# Patient Record
(demographics unavailable — no encounter records)

---

## 2017-11-05 NOTE — ER DOCUMENT REPORT
ED General





- General


Stated Complaint: WEAKNESS


Time Seen by Provider: 11/05/17 02:39


Notes: 





Patient is a 75-year-old male that comes by EMS for chief complaint of feeling 

short of breath and lightheaded along with feeling weak.  He states that for 

the past 3 days he has not felt well, he has not eaten much, he has missed some 

of his doses of medications.  He denies current shortness of breath at rest.  

He denies any chest pain, fever, cough, abdominal pain, vomiting.  Past medical 

history of chronic atrial fibrillation, on Pradaxa, on Cardizem, defibrillator, 

COPD, former smoker.  He lives by himself.  He is on 3 L nasal cannula at all 

times at home.


TRAVEL OUTSIDE OF THE [place] IN LAST 30 DAYS: No





- Related Data


Allergies/Adverse Reactions: 


 





No Known Allergies Allergy (Verified 02/21/17 15:21)


 











Past Medical History





- General


Information source: Patient





- Social History


Smoking Status: Former Smoker


Frequency of alcohol use: None


Drug Abuse: None


Lives with: Alone


Family History: Reviewed & Not Pertinent





- Past Medical History


Cardiac Medical History: Reports: Hx Atrial Fibrillation - paroxysmal, Hx 

Coronary Artery Disease, Hx Hypercholesterolemia, Hx Hypertension


   Denies: Hx Heart Attack


Pulmonary Medical History: Reports: Hx Asthma, Hx Bronchitis, Hx COPD, Hx 

Pneumonia - june 2012, Hx Respiratory Failure


   Denies: Hx Tuberculosis


Neurological Medical History: Denies: Hx Cerebrovascular Accident, Hx Seizures


Renal/ Medical History: Reports: Hx Benign Prostatic Hyperplasia, Hx Kidney 

Stones


GI Medical History: Reports: Hx Hiatal Hernia


Musculoskeltal Medical History: Reports Hx Arthritis


Past Surgical History: Reports: Hx Pacemaker





- Immunizations


Hx Diphtheria, Pertussis, Tetanus Vaccination: Yes


Hx Pneumococcal Vaccination: 01/01/11





Review of Systems





- Review of Systems


Constitutional: See HPI


EENT: No symptoms reported


Cardiovascular: See HPI


Respiratory: See HPI


Gastrointestinal: No symptoms reported


Genitourinary: No symptoms reported


Male Genitourinary: No symptoms reported


Musculoskeletal: No symptoms reported


Skin: No symptoms reported


Hematologic/Lymphatic: No symptoms reported


Neurological/Psychological: No symptoms reported





Physical Exam





- Vital signs


Vitals: 


 











Resp


 


 20 


 


 11/05/17 02:36











Interpretation: Normal





- General


General appearance: Appears well, Alert


In distress: None





- HEENT


Head: Normocephalic, Atraumatic


Eyes: Normal


Pupils: PERRL





- Respiratory


Respiratory status: No respiratory distress.  No: Labored, Tachypnea


Chest status: Nontender


Breath sounds: Decreased air movement.  No: Nonproductive cough, Productive 

cough, Rales, Rhonchi, Stridor, Wheezing


Chest palpation: Normal





- Cardiovascular


Rhythm: Irregularly irregular, Tachycardia


Heart sounds: Normal auscultation, S1 appreciated, S2 appreciated


Normal capillary refill: Yes





- Abdominal


Inspection: Normal


Distension: No distension


Bowel sounds: Normal


Tenderness: Nontender.  No: Tender, Guarding


Organomegaly: No organomegaly





- Back


Back: Normal, Nontender.  No: Tender





- Extremities


General upper extremity: Normal inspection, Nontender, Normal color, Normal ROM

, Normal temperature


General lower extremity: Normal inspection, Nontender, Normal color, Normal ROM

, Normal temperature, Normal weight bearing.  No: Ryan's sign





- Neurological


Neuro grossly intact: Yes


Cognition: Normal


Orientation: AAOx4


[place] Coma Scale Eye Opening: Spontaneous


[place] Coma Scale Verbal: Oriented


[place] Coma Scale Motor: Obeys Commands


[place] Coma Scale Total: 15


Speech: Normal


Motor strength normal: LUE, RUE, LLE, RLE


Sensory: Normal





- Psychological


Associated symptoms: Normal affect, Normal mood





- Skin


Skin Temperature: Warm


Skin Moisture: Dry


Skin Color: Normal





Course





- Re-evaluation


Re-evalutation: 





11/05/17 03:48


Patient initially tachycardic in the 130s with atrial fibrillation with rapid 

ventricular response noted on the monitor.  However patient was given 1 L IV 

fluid bolus, after EKG was obtained EKG shows sinus tachycardia at a rate of 

121.  No T-wave inversions in consecutive leads or ST segment changes, no 

significant change from prior.





Patient has a soft abdomen, clear lungs, he is generally well-appearing.





CBC shows significant anemia at hemoglobin of 7.0.  This is most likely the 

cause of patient's symptoms.  I checked his rectal exam, I did not see any 

gross bleeding, patient denies any blood in his stool recently, laboratory 

results is negative for blood in the stool.  Chemistry generally unremarkable, 

cardiac enzymes not elevated, urinalysis shows evidence of some dehydration but 

is otherwise unremarkable.





Beginning transfusion.  Patient is still somewhat tachycardic.  He states he 

has had trouble eating and caring for himself at home.  He lives alone.  

Concern about whether or not patient will be stable for discharge.  He is not 

stable at this time.  Discussed with Dr. Jamison, recommends admission to the 

hospital.





Discussed with Dr. Cavazos, on-call for Dr. Vazquez patient's primary 

provider, patient will be admitted to telemetry.  Patient states satisfaction 

and agreement with this plan.





- Vital Signs


Vital signs: 


 











Temp Pulse Resp BP Pulse Ox


 


 98.4 F   110 H  16   115/48 L  100 


 


 11/05/17 06:32  11/05/17 06:32  11/05/17 06:32  11/05/17 06:32  11/05/17 06:32














- Laboratory


Result Diagrams: 


 11/05/17 02:45





 11/05/17 02:45


Laboratory results interpreted by me: 


 











  11/05/17 11/05/17 11/05/17





  02:45 02:45 03:15


 


RBC  2.30 L  


 


Hgb  7.0 L  


 


Hct  21.4 L  


 


RDW  19.9 H  


 


Seg Neutrophils %  79.7 H  


 


Lymphocytes %  12.4 L  


 


BUN   40 H 


 


Glucose   118 H 


 


AST   11 L 


 


Creatine Kinase   < 20 L 


 


Total Protein   5.6 L 


 


Urine Ketones   


 


Urine Blood   


 


Ur Leukocyte Esterase   


 


Crossmatch    See Detail














  11/05/17





  04:35


 


RBC 


 


Hgb 


 


Hct 


 


RDW 


 


Seg Neutrophils % 


 


Lymphocytes % 


 


BUN 


 


Glucose 


 


AST 


 


Creatine Kinase 


 


Total Protein 


 


Urine Ketones  TRACE H


 


Urine Blood  SMALL H


 


Ur Leukocyte Esterase  TRACE H


 


Crossmatch 














Discharge





- Discharge


Clinical Impression: 


 Symptomatic anemia, Tachycardia, Shortness of breath, Weakness





Condition: Stable


Disposition: ADMITTED AS INPATIENT


Admitting Provider: [facility]


Unit Admitted: Telemetry

## 2017-11-05 NOTE — EKG REPORT
SEVERITY:- ABNORMAL ECG -

SINUS TACHYCARDIA

ANTERIOR INFARCT, AGE INDETERMINATE

:

Confirmed by: Franco Lazar 05-Nov-2017 15:01:47

## 2017-11-05 NOTE — RADIOLOGY REPORT (SQ)
EXAM DESCRIPTION:  CHEST SINGLE VIEW



COMPLETED DATE/TIME:  11/5/2017 3:01 am



REASON FOR STUDY:  shortness of breath



COMPARISON:  7/14/2017



EXAM PARAMETERS:  NUMBER OF VIEWS: One view.

TECHNIQUE: Single frontal radiographic view of the chest acquired.

RADIATION DOSE: NA

LIMITATIONS: None.



FINDINGS:  LUNGS AND PLEURA: Severe emphysema.  No focal airspace disease, pleural effusion, or pneum
othorax.

MEDIASTINUM AND HILAR STRUCTURES: No masses.  Contour normal.

HEART AND VASCULAR STRUCTURES: Heart normal in size.  Normal vasculature.

BONES: No acute findings.

HARDWARE: None in the chest.

OTHER: No other significant finding.



IMPRESSION:  SEVERE EMPHYSEMA.  NO ACUTE CARDIOPULMONARY PROCESS.  NO SIGNIFICANT CHANGE FROM PRIOR S
TRE.



TECHNICAL DOCUMENTATION:  JOB ID:  [id number]

 2011 [employer]- All Rights Reserved

## 2017-11-05 NOTE — PDOC H&P
History of Present Illness


Admission Date/PCP: 


  11/05/17 05:58





  





History of Present Illness: 


PEDRO REYES is a 75 year old male patient of Dr Vazquez who was brought to 

the ED by EMS crew with complain of generalized weakness, shortness of breath 

and lightheadedness. Patient reported that he has not been feeling well for 

about 3 days prior to his presentation in the ED. Due to his current illness he 

has been missing some of his medication administration. His initial findings in 

the ED revealed significant tachycardia with cardiac monitor revealing atrial 

fibrillation. There was concern for associated significant anemia with 

hemoglobin in the 7.0 gm/dL range. His stool guaiac test for occult blood was 

negative. I spoke with his daughter via telephone earlier this morning and she 

reported that patient usually present to the hospital like this in the past. 

She requested for SNF placement fort rehabilitation. His morbidities include 

COPD with need for supplemental oxygen, Paroxysmal Atrial Fibrillation, 

Hypertension, Hyperlipidemia, BPH, Renal stones, Osteoarthritis, Hiatus hernia, 

and Pacemaker placement.





Past Medical History


Cardiac Medical History: Reports: Atrial Fibrillation - paroxysmal, Coronary 

Artery Disease, Hyperlipidema, Hypertension


   Denies: Myocardial Infarction


Pulmonary Medical History: Reports: Asthma, Bronchitis, Chronic Obstructive 

Pulmonary Disease (COPD), Pneumonia - june 2012, Respiratory Failure


   Denies: Tuberculosis


Neurological Medical History: 


   Denies: Seizures


GI Medical History: Reports: Hiatal Hernia


Musculoskeltal Medical History: Reports: Arthritis


Psychiatric Medical History: Reports: Depression


Hematology: Reports: Anemia - chronic





Past Surgical History


Past Surgical History: Reports: Pacemaker





Social History


Lives with: Alone


Smoking Status: Former Smoker


Frequency of Alcohol Use: None


Hx Recreational Drug Use: No - denies


Drugs: None


Hx Prescription Drug Abuse: No





- Advance Directive


Resuscitation Status: Full Code





Family History


Family History: Reviewed & Not Pertinent


Parental Family History Reviewed: Yes


Children Family History Reviewed: Yes


Sibling(s) Family History Reviewed.: Yes





Medication/Allergy


Home Medications: 








Dabigatran Etexilate Mesylate [Pradaxa 150 mg Capsule] 150 mg PO Q12 07/14/17 


Diltiazem HCl [Diltiazem 24Hr ER] 180 mg PO DAILY 07/14/17 


Esomeprazole Mag Trihydrate [Nexium] 40 mg PO DAILY 07/14/17 


Finasteride [Proscar 5 mg Tablet] 5 mg PO DAILY 07/14/17 


Fluticasone/Salmeterol [Advair 250-50 Diskus 28 dose] 1 puff IH Q12 07/14/17 


Hydrochlorothiazide 25 mg PO DAILY 07/14/17 


Montelukast Sodium [Singulair 10 mg Tablet] 10 mg PO QPM 07/14/17 


Sertraline HCl [Zoloft 50 mg Tablet] 50 mg PO DAILY 07/14/17 


Simvastatin 40 mg PO QHS 07/14/17 


Tamsulosin HCl [Flomax 0.4 mg Cap.sr] 0.8 mg PO QHS 07/14/17 


Tiotropium Bromide [Spiriva Handihaler 18 mcg/dose (30 Dose)] 1 cap IH DAILY 07/ 14/17 


Albuterol Sulfate [Proair Hfa Inhalation Aerosol 8.5 gm Mdi] 2 puff IH Q4HP PRN 

11/05/17 


Megestrol Acetate 800 mg PO DAILY 11/05/17 


Roflumilast [Daliresp 500 mcg Tablet] 500 mcg PO DAILY 11/05/17 








Allergies/Adverse Reactions: 


 





No Known Allergies Allergy (Verified 02/21/17 15:21)


 











Review of Systems


Constitutional: PRESENT: anorexia, fatigue, weakness.  ABSENT: as per HPI, 

chills, fever(s), headache(s), night sweats, weight gain, weight loss, other


Eyes: PRESENT: visual disturbances


Ears: PRESENT: hearing changes


Nose, Mouth, and Throat: PRESENT: mouth pain.  ABSENT: as per HPI, headache(s), 

sore throat, vertigo, other


Cardiovascular: PRESENT: dyspnea on exertion, palpitations.  ABSENT: as per HPI

, chest pain, edema, orthropnea, other


Respiratory: ABSENT: cough, hemoptysis


Gastrointestinal: ABSENT: abdominal pain, constipation, diarrhea, hematemesis, 

hematochezia, nausea, vomiting


Genitourinary: ABSENT: dysuria, hematuria


Musculoskeletal: PRESENT: deformity


Integumentary: ABSENT: rash, wounds


Neurological: PRESENT: weakness.  ABSENT: as per HPI, abnormal gait, abnormal 

movements, abnormal speech, confusion, convulsions, dizziness, focal weakness, 

frequent falls, lack of coordination, memory loss, numbness, paresthesias, 

restless legs, syncope, tingling, tremor(s), vertigo, other


Psychiatric: ABSENT: anxiety, depression, homidical ideation, suicidal ideation


Endocrine: ABSENT: cold intolerance, heat intolerance, menstrual abnormalities, 

polydipsia, polyuria


Hematologic/Lymphatic: ABSENT: easy bleeding, easy bruising, lymphadenopathy





Physical Exam


Vital Signs: 


 











Temp Pulse Resp BP Pulse Ox


 


 98.2 F   98   16   116/69   100 


 


 11/05/17 12:14  11/05/17 12:14  11/05/17 12:14  11/05/17 12:14  11/05/17 12:14








 Intake & Output











 11/04/17 11/05/17 11/06/17





 07:59 06:59 06:59


 


Intake Total   350


 


Output Total   


 


Balance   350


 


Weight   











General appearance: PRESENT: no acute distress, well-developed, well-nourished


Head exam: PRESENT: atraumatic, normocephalic


Eye exam: PRESENT: conjunctiva pink, EOMI, PERRLA.  ABSENT: scleral icterus


Mouth exam: PRESENT: moist


Neck exam: PRESENT: full ROM.  ABSENT: carotid bruit, JVD, lymphadenopathy, 

thyromegaly


Respiratory exam: PRESENT: clear to auscultation rivera


Cardiovascular exam: PRESENT: RRR.  ABSENT: diastolic murmur, rubs, systolic 

murmur


Pulses: PRESENT: normal dorsalis pedis pul, +2 pedal pulses bilateral


Vascular exam: PRESENT: normal capillary refill.  ABSENT: pallor


GI/Abdominal exam: PRESENT: normal bowel sounds, soft.  ABSENT: distended, 

guarding, mass, organolmegaly, rebound, tenderness


Rectal exam: PRESENT: deferred - completed during ED evaluation, heme (-) stool


Extremities exam: ABSENT: pedal edema


Musculoskeletal exam: PRESENT: normal inspection


Neurological exam: PRESENT: alert, awake, oriented to person, oriented to place

, oriented to time, oriented to situation, CN II-XII grossly intact.  ABSENT: 

motor sensory deficit


Psychiatric exam: PRESENT: appropriate affect, normal mood.  ABSENT: homicidal 

ideation, suicidal ideation


Skin exam: PRESENT: dry, intact, warm.  ABSENT: cyanosis, rash





Results


Impressions: 


 





Chest X-Ray  11/05/17 02:45


IMPRESSION:  SEVERE EMPHYSEMA.  NO ACUTE CARDIOPULMONARY PROCESS.  NO 

SIGNIFICANT CHANGE FROM PRIOR STUDY.


 














Assessment & Plan





- Diagnosis


(1) Symptomatic anemia


Is this a current diagnosis for this admission?: Yes   


Plan: 


See covering admitting attending physician orders.








(2) Atrial fibrillation with rapid ventricular response


Is this a current diagnosis for this admission?: Yes   


Plan: 


See covering admitting attending physician orders.








(3) End stage chronic obstructive pulmonary disease


Is this a current diagnosis for this admission?: Yes   


Plan: 


See covering admitting attending physician orders.








- Time


Time Spent: 50 to 70 Minutes


Medications reviewed and adjusted accordingly: Yes


Anticipated discharge: SNF - for short term rehabilitation at [facility] as 

per daughter's choice.





- Inpatient Certification


Based on my medical assessment, after consideration of the patient's 

comorbidities, presenting symptoms, or acuity I expect that the services needed 

warrant INPATIENT care.: Yes


I certify that my determination is in accordance with my understanding of 

Medicare's requirements for reasonable and necessary INPATIENT services [42 CFR 

412.3e].: Yes


Medical Necessity: Need Close Monitoring Due to Risk of Patient Decompensation, 

Need For IV Fluids, Need For Continuous Telemetry Monitoring, Risk of 

Complication if Not Cared For in Hospital


Post Hospital Care: D/C Planner Documentation





- Plan Summary


Plan Summary: 


See covering admitting attending physician orders.

## 2017-11-06 NOTE — PDOC PROGRESS REPORT
Subjective


Progress Note for:: 11/06/17


Subjective:: 





He was admitted over the weekend for the management of generalized body weakness

, he was found to have severe anemia requiring blood transfusion.  He admitted 

to passing black stools





Physical Exam


Vital Signs: 


 











Temp Pulse Resp BP Pulse Ox


 


 99.1 F   102 H  22 H  102/51 L  100 


 


 11/06/17 15:58  11/06/17 15:58  11/06/17 15:58  11/06/17 15:58  11/06/17 15:58








 Intake & Output











 11/05/17 11/06/17 11/07/17





 06:59 06:59 06:59


 


Intake Total  3230 2380


 


Output Total  1700 300


 


Balance  1530 2080


 


Weight   











General appearance: PRESENT: thin


Head exam: PRESENT: atraumatic, normocephalic


Eye exam: PRESENT: PERRLA.  ABSENT: scleral icterus


Neck exam: PRESENT: full ROM


Respiratory exam: PRESENT: clear to auscultation rivera


Cardiovascular exam: PRESENT: RRR, +S1, +S2


Pulses: PRESENT: normal dorsalis pedis pul, +2 pedal pulses bilateral


Vascular exam: PRESENT: normal capillary refill


GI/Abdominal exam: PRESENT: normal bowel sounds, soft


Rectal exam: PRESENT: deferred


Neurological exam: PRESENT: alert


Psychiatric exam: PRESENT: appropriate affect, normal mood


Skin exam: PRESENT: dry, intact, warm





Results


Laboratory Results: 


 





 11/06/17 06:52 





 11/06/17 06:52 





 











  11/06/17 11/06/17





  06:52 06:52


 


WBC  6.0 


 


RBC  2.23 L 


 


Hgb  7.1 L 


 


Hct  20.8 L 


 


MCV  93 


 


MCH  31.9 


 


MCHC  34.2 


 


RDW  17.0 H 


 


Plt Count  190 


 


Seg Neutrophils %  79.3 H 


 


Lymphocytes %  12.0 L 


 


Monocytes %  7.2 


 


Eosinophils %  1.1 


 


Basophils %  0.4 


 


Absolute Neutrophils  4.7 


 


Absolute Lymphocytes  0.7 


 


Absolute Monocytes  0.4 


 


Absolute Eosinophils  0.1 


 


Absolute Basophils  0.0 


 


Sodium   140.2


 


Potassium   3.7


 


Chloride   102


 


Carbon Dioxide   33 H


 


Anion Gap   5


 


BUN   22 H


 


Creatinine   0.78


 


Est GFR ( Amer)   > 60


 


Est GFR (Non-Af Amer)   > 60


 


Glucose   91


 


Calcium   8.7


 


Total Bilirubin   0.7


 


AST   10 L


 


ALT   19 L


 


Alkaline Phosphatase   36 L


 


Total Protein   4.3 L


 


Albumin   2.5 L











Impressions: 


 





Chest X-Ray  11/05/17 02:45


IMPRESSION:  SEVERE EMPHYSEMA.  NO ACUTE CARDIOPULMONARY PROCESS.  NO 

SIGNIFICANT CHANGE FROM PRIOR STUDY.


 














Assessment & Plan





- Diagnosis


(1) Gastrointestinal bleeding


Qualifiers: 


   GI bleed type/associated pathology: unspecified gastrointestinal hemorrhage 

type   Qualified Code(s): K92.2 - Gastrointestinal hemorrhage, unspecified   


Is this a current diagnosis for this admission?: Yes   


Plan: 


Continue blood transfusion, GI consultation will be obtained








(2) Anemia due to acute blood loss


Is this a current diagnosis for this admission?: Yes   





(3) End stage chronic obstructive pulmonary disease


Is this a current diagnosis for this admission?: Yes

## 2017-11-07 NOTE — PDOC PROGRESS REPORT
Subjective


Progress Note for:: 11/07/17


Subjective:: 





The hemoglobin is 6.9 after many blood transfusion, GI consultation is obtained 

for colonoscopy and EGD.  Patient is also malnourished, body mass index 15.2





Physical Exam


Vital Signs: 


 











Temp Pulse Resp BP Pulse Ox


 


 99.1 F   91   20   94/50 L  100 


 


 11/07/17 17:30  11/07/17 17:30  11/07/17 17:30  11/07/17 17:30  11/07/17 17:30








 Intake & Output











 11/06/17 11/07/17 11/08/17





 06:59 06:59 06:59


 


Intake Total 3230 2800 0


 


Output Total 1700 1050 


 


Balance 1530 1750 0











General appearance: PRESENT: no acute distress, thin


Eye exam: PRESENT: PERRLA


Respiratory exam: PRESENT: decreased breath sounds


Cardiovascular exam: PRESENT: +S1, +S2


GI/Abdominal exam: PRESENT: soft


Neurological exam: PRESENT: alert, CN II-XII grossly intact





Results


Laboratory Results: 


 





 11/07/17 13:43 





 11/07/17 13:43 





 











  11/07/17 11/07/17 11/07/17





  12:50 13:43 13:43


 


WBC   4.7 


 


RBC   2.20 L 


 


Hgb   6.9 L 


 


Hct   20.5 L 


 


MCV   93 


 


MCH   31.5 


 


MCHC   33.8 


 


RDW   16.8 H 


 


Plt Count   183 


 


Seg Neutrophils %   75.2 


 


Lymphocytes %   16.1 


 


Monocytes %   7.1 


 


Eosinophils %   1.4 


 


Basophils %   0.2 


 


Absolute Neutrophils   3.6 


 


Absolute Lymphocytes   0.8 


 


Absolute Monocytes   0.3 


 


Absolute Eosinophils   0.1 


 


Absolute Basophils   0.0 


 


Sodium    138.3


 


Potassium    3.7


 


Chloride    100


 


Carbon Dioxide    34 H


 


Anion Gap    4 L


 


BUN    16


 


Creatinine    0.65


 


Est GFR ( Amer)    > 60


 


Est GFR (Non-Af Amer)    > 60


 


Glucose    101


 


Calcium    9.1


 


Total Bilirubin    0.5


 


AST    11 L


 


ALT    21


 


Alkaline Phosphatase    37 L


 


Total Protein    4.2 L


 


Albumin    2.4 L


 


Stool Occult Blood  POSITIVE  











Impressions: 


 





Chest X-Ray  11/05/17 02:45


IMPRESSION:  SEVERE EMPHYSEMA.  NO ACUTE CARDIOPULMONARY PROCESS.  NO 

SIGNIFICANT CHANGE FROM PRIOR STUDY.


 














Assessment & Plan





- Diagnosis


(1) Gastrointestinal bleeding


Qualifiers: 


   GI bleed type/associated pathology: unspecified gastrointestinal hemorrhage 

type   Qualified Code(s): K92.2 - Gastrointestinal hemorrhage, unspecified   


Is this a current diagnosis for this admission?: Yes   





(2) Anemia due to acute blood loss


Is this a current diagnosis for this admission?: Yes   


Plan: 


Transfuse packed red blood cells








(3) End stage chronic obstructive pulmonary disease


Is this a current diagnosis for this admission?: Yes

## 2017-11-08 NOTE — PDOC CONSULTATION
Consultation


Consult Date: 11/07/17





History of Present Illness


Admission Date/PCP: 


  11/05/17 05:58





  





History of Present Illness: 





This is a 75-year-old patient was admitted on 11/5/2017 with generalized 

weakness shortness of breath and lightheadedness.  He had not been feeling well 

for 3 days prior to being admitted.  He has also been seeing black stools off 

and on for the last 1-2 weeks.  On presentation his hemoglobin was 7 though his 

stool was negative for occult blood.  He denies abdominal pain.  He takes 

Pradaxa every day


He had an EGD and colonoscopy in February of this year that showed multiple 

adenomatous polyps and he suffered a post polypectomy bleeding thereafter.  He 

had another colonoscopy on 3/1/2017 with treatment of the post polypectomy 

ulcer.





Past Medical History


Cardiac Medical History: Reports: Atrial Fibrillation - paroxysmal, Coronary 

Artery Disease, Hyperlipidema, Hypertension


   Denies: Myocardial Infarction


Pulmonary Medical History: Reports: Asthma, Bronchitis, Chronic Obstructive 

Pulmonary Disease (COPD), Pneumonia - june 2012, Respiratory Failure


   Denies: Tuberculosis


Neurological Medical History: 


   Denies: Seizures


GI Medical History: Reports: Hiatal Hernia


Musculoskeltal Medical History: Reports: Arthritis


Psychiatric Medical History: Reports: Depression


Hematology: Reports: Anemia - chronic





Past Surgical History


Past Surgical History: Reports: Pacemaker





Social History


Lives with: Alone


Smoking Status: Former Smoker


Frequency of Alcohol Use: None


Hx Recreational Drug Use: No - denies


Drugs: None


Hx Prescription Drug Abuse: No





- Advance Directive


Resuscitation Status: Full Code





Family History


Family History: Reviewed & Not Pertinent


Parental Family History Reviewed: No


Children Family History Reviewed: NA


Sibling(s) Family History Reviewed.: NA





Medication/Allergy


Home Medications: 








Dabigatran Etexilate Mesylate [Pradaxa 150 mg Capsule] 150 mg PO Q12 07/14/17 


Diltiazem HCl [Diltiazem 24Hr ER] 180 mg PO DAILY 07/14/17 


Esomeprazole Mag Trihydrate [Nexium] 40 mg PO DAILY 07/14/17 


Finasteride [Proscar 5 mg Tablet] 5 mg PO DAILY 07/14/17 


Fluticasone/Salmeterol [Advair 250-50 Diskus 28 dose] 1 puff IH Q12 07/14/17 


Hydrochlorothiazide 25 mg PO DAILY 07/14/17 


Montelukast Sodium [Singulair 10 mg Tablet] 10 mg PO QPM 07/14/17 


Sertraline HCl [Zoloft 50 mg Tablet] 50 mg PO DAILY 07/14/17 


Simvastatin 40 mg PO QHS 07/14/17 


Tamsulosin HCl [Flomax 0.4 mg Cap.sr] 0.8 mg PO QHS 07/14/17 


Tiotropium Bromide [Spiriva Handihaler 18 mcg/dose (30 Dose)] 1 cap IH DAILY 07/ 14/17 


Albuterol Sulfate [Proair Hfa Inhalation Aerosol 8.5 gm Mdi] 2 puff IH Q4HP PRN 

11/05/17 


Megestrol Acetate 800 mg PO DAILY 11/05/17 


Roflumilast [Daliresp 500 mcg Tablet] 500 mcg PO DAILY 11/05/17 








Allergies/Adverse Reactions: 


 





No Known Allergies Allergy (Verified 02/21/17 15:21)


 











Review of Systems


All systems: reviewed and no additional remarkable complaints except as stated





Physical Exam


Vital Signs: 


 











Temp Pulse Resp BP Pulse Ox


 


 97.8 F   122 H  16   130/85 H  96 


 


 11/08/17 16:19  11/08/17 18:25  11/08/17 18:25  11/08/17 18:25  11/08/17 18:25








 Intake & Output











 11/07/17 11/08/17 11/09/17





 06:59 06:59 06:59


 


Intake Total 2800 2583 1150


 


Output Total 1050 1290 


 


Balance 1750 1293 1150


 


Weight  56.6 kg 











Exam: 





General: Patient is alert and looks well.





HEENT: There is pallor.  PERRLA.  Oropharynx normal





Respiratory: Reduced breath sounds bilaterally l





Cardiovascular: Heart sounds 1 and 2 normal with no murmurs.





Abdominal: Not distended.  Soft and nontender.  Liver and spleen not palpable.  

No ascites demonstrated.  Bowel sounds active.  Rectal examination was deferred.





Extremities: No edema





Neurological: Alert and oriented x4.  Grossly nonfocal.  Normal speech





Skin: No significant rash





Psychological: Normal affect





Results


Laboratory Results: 


 





 11/07/17 13:43 





 11/07/17 13:43 








Impressions: 


 





Chest X-Ray  11/05/17 02:45


IMPRESSION:  SEVERE EMPHYSEMA.  NO ACUTE CARDIOPULMONARY PROCESS.  NO 

SIGNIFICANT CHANGE FROM PRIOR STUDY.


 














Assessment & Plan





- Diagnosis


(1) Gastrointestinal bleeding


Qualifiers: 


   GI bleed type/associated pathology: unspecified gastrointestinal hemorrhage 

type   Qualified Code(s): K92.2 - Gastrointestinal hemorrhage, unspecified   


Is this a current diagnosis for this admission?: Yes   


Plan: 


He presented with melena over the last 1-2 weeks and symptomatic anemia.  He 

also takes Pradaxa every day.  He will undergo an EGD and colonoscopy for 

further evaluation.








(2) High risk medication use


Is this a current diagnosis for this admission?: Yes   





(3) Anemia due to acute blood loss


Is this a current diagnosis for this admission?: Yes   





(4) Symptomatic anemia


Is this a current diagnosis for this admission?: Yes   





(5) Chronic atrial fibrillation


Is this a current diagnosis for this admission?: Yes

## 2017-11-08 NOTE — OPERATIVE REPORT
Operative Report


DATE OF SURGERY: 11/08/17


Operative Report: 





Pre-op diagnosis: GI bleeding





Post-op diagnosis:


1.  Active bleeding in the third part of duodenum ?AVM vs Dieulafoy's lesion


2.  Sigmoid diverticulosis


3.  Limited view of the colon





Surgery: Upper endoscopy with argon plasma coagulation, and Colonoscopy 





Medications: Versed 4mg,   Fentanyl  1mcg IV push





Tissue removed: 





Procedure: After informed consent obtained from patient, patient's pharynx was 

sprayed with Hurricane and conscious sedation was achieved.  The upper 

endoscope was then inserted into the esophagus under direct vision and advanced 

into the stomach and further into the duodenum.  Detailed examination of the 

duodenum, stomach and the esophagus was then performed. 


 A digital rectal examination was performed and this was unremarkable.  The 

colonoscope was inserted into the rectum and advanced to the cecum.  The 

appendiceal orifice and the terminal ileum were both identified.  The mucosa 

was examined into details as the colonoscope was slowly pulled out of the 

patient.  The endoscope was retroflexed in the rectum. Patient tolerated the 

procedure well.





Findings





Esophagus: Normal


Stomach: Gastric body polyp.  No evidence of bleeding


Duodenum: Active bleeding was identified in the third part of duodenum with no 

obvious mucosal abnormality.  It was difficult to view this area in detail due 

to significant peristalsis despite using IV glucagon.  Eventually the general 

area of bleeding was identified and treated with argon.


Cecum: Normal


Ascending colon: Normal


Transverse colon: Limited view


Descending colon: Normal


Sigmoid colon: A few diverticuli noted.  View of the colon was limited


Rectum: Limited view





Plan: Avoid anticoagulants for many days and continue to follow H&H


OPERATION: .

## 2017-11-08 NOTE — PDOC PROGRESS REPORT
Subjective


Progress Note for:: 11/08/17


Subjective:: 





Patient is scheduled for colonoscopy, EGD today





Physical Exam


Vital Signs: 


 











Temp Pulse Resp BP Pulse Ox


 


 98.5 F   87   12   102/53 L  100 


 


 11/08/17 11:56  11/08/17 14:00  11/08/17 11:56  11/08/17 11:56  11/08/17 11:56








 Intake & Output











 11/07/17 11/08/17 11/09/17





 06:59 06:59 06:59


 


Intake Total 2800 2583 


 


Output Total 1050 1290 


 


Balance 1750 1293 


 


Weight  56.6 kg 











General appearance: PRESENT: no acute distress


Head exam: PRESENT: atraumatic, normocephalic


Eye exam: PRESENT: conjunctiva pink, EOMI, PERRLA


Ear exam: PRESENT: normal external ear exam


Mouth exam: PRESENT: moist, tongue midline


Neck exam: PRESENT: full ROM


Respiratory exam: PRESENT: clear to auscultation rivera


Cardiovascular exam: PRESENT: RRR, +S1, +S2


Pulses: PRESENT: normal dorsalis pedis pul, +2 pedal pulses bilateral


Vascular exam: PRESENT: normal capillary refill


GI/Abdominal exam: PRESENT: normal bowel sounds, soft


Rectal exam: PRESENT: deferred


Neurological exam: PRESENT: alert, awake, oriented to person, oriented to place

, oriented to time, oriented to situation, CN II-XII grossly intact.  ABSENT: 

motor sensory deficit


Psychiatric exam: PRESENT: appropriate affect, normal mood


Skin exam: PRESENT: dry, intact, warm





Results


Laboratory Results: 


 





 11/07/17 13:43 





 11/07/17 13:43 








Impressions: 


 





Chest X-Ray  11/05/17 02:45


IMPRESSION:  SEVERE EMPHYSEMA.  NO ACUTE CARDIOPULMONARY PROCESS.  NO 

SIGNIFICANT CHANGE FROM PRIOR STUDY.


 














Assessment & Plan





- Diagnosis


(1) Gastrointestinal bleeding


Qualifiers: 


   GI bleed type/associated pathology: unspecified gastrointestinal hemorrhage 

type   Qualified Code(s): K92.2 - Gastrointestinal hemorrhage, unspecified   


Is this a current diagnosis for this admission?: Yes   





(2) Anemia due to acute blood loss


Is this a current diagnosis for this admission?: Yes   





(3) End stage chronic obstructive pulmonary disease


Is this a current diagnosis for this admission?: Yes   





(4) Undernutrition syndrome


Is this a current diagnosis for this admission?: Yes

## 2017-11-09 NOTE — PDOC PROGRESS REPORT
Subjective


Progress Note for:: 11/09/17


Subjective:: 





He was seen by the bedside, yesterday he had EGD and colonoscopy he was found 

to have active bleeding in the top part of duodenum with no obvious mucosa 

pathology.





Physical Exam


Vital Signs: 


 











Temp Pulse Resp BP Pulse Ox


 


 98.5 F   94   12   109/59 L  98 


 


 11/09/17 15:30  11/09/17 15:30  11/09/17 15:30  11/09/17 15:30  11/09/17 15:30








 Intake & Output











 11/08/17 11/09/17 11/10/17





 06:59 06:59 06:59


 


Intake Total 2583 2070 1977


 


Output Total 1290 600 925


 


Balance 1293 1470 1052


 


Weight 56.6 kg 56.4 kg 











General appearance: PRESENT: no acute distress


Eye exam: PRESENT: PERRLA


Respiratory exam: PRESENT: clear to auscultation rivera


Cardiovascular exam: PRESENT: +S1, +S2


GI/Abdominal exam: PRESENT: soft





Results


Laboratory Results: 


 





 11/09/17 09:27 





 11/09/17 09:27 





 











  11/09/17 11/09/17





  09:27 09:27


 


WBC  5.5 


 


RBC  2.58 L 


 


Hgb  8.3 L 


 


Hct  24.1 L 


 


MCV  93 


 


MCH  32.0 


 


MCHC  34.3 


 


RDW  16.0 H 


 


Plt Count  169 


 


Seg Neutrophils %  85.3 H 


 


Lymphocytes %  8.2 L 


 


Monocytes %  5.2 


 


Eosinophils %  1.0 


 


Basophils %  0.3 


 


Absolute Neutrophils  4.7 


 


Absolute Lymphocytes  0.4 L 


 


Absolute Monocytes  0.3 


 


Absolute Eosinophils  0.1 


 


Absolute Basophils  0.0 


 


Sodium   139.6


 


Potassium   3.4 L


 


Chloride   102


 


Carbon Dioxide   33 H


 


Anion Gap   5


 


BUN   10


 


Creatinine   0.59


 


Est GFR ( Amer)   > 60


 


Est GFR (Non-Af Amer)   > 60


 


Glucose   162 H


 


Calcium   8.6


 


Total Bilirubin   0.6


 


AST   16 L


 


ALT   29


 


Alkaline Phosphatase   36 L


 


Total Protein   4.0 L


 


Albumin   2.2 L











Impressions: 


 





Chest X-Ray  11/05/17 02:45


IMPRESSION:  SEVERE EMPHYSEMA.  NO ACUTE CARDIOPULMONARY PROCESS.  NO 

SIGNIFICANT CHANGE FROM PRIOR STUDY.


 














Assessment & Plan





- Diagnosis


(1) Gastrointestinal bleeding


Qualifiers: 


   GI bleed type/associated pathology: unspecified gastrointestinal hemorrhage 

type   Qualified Code(s): K92.2 - Gastrointestinal hemorrhage, unspecified   


Is this a current diagnosis for this admission?: Yes   





(2) Anemia due to acute blood loss


Is this a current diagnosis for this admission?: Yes   





(3) End stage chronic obstructive pulmonary disease


Is this a current diagnosis for this admission?: Yes   





(4) Undernutrition syndrome


Is this a current diagnosis for this admission?: Yes   





(5) Dieulafoy lesion of duodenum


Is this a current diagnosis for this admission?: Yes   





(6) Upper gastrointestinal bleeding


Is this a current diagnosis for this admission?: Yes   





(7) Weight loss


Is this a current diagnosis for this admission?: Yes   


Plan: 


HIV screening test ordered

## 2017-11-10 NOTE — PDOC PROGRESS REPORT
Subjective


Progress Note for:: 11/10/17


Subjective:: 





The hemoglobin is low today again he required blood transfusion





Physical Exam


Vital Signs: 


 











Temp Pulse Resp BP Pulse Ox


 


 97.3 F   83   22 H  110/51 L  98 


 


 11/10/17 18:23  11/10/17 18:23  11/10/17 18:23  11/10/17 18:23  11/10/17 18:23








 Intake & Output











 11/09/17 11/10/17 11/11/17





 06:59 06:59 06:59


 


Intake Total 2070 2477 2477


 


Output Total 600 1775 800


 


Balance 9468 379 3032


 


Weight 56.4 kg  











General appearance: PRESENT: no acute distress


Eye exam: PRESENT: PERRLA


Respiratory exam: PRESENT: clear to auscultation rivera


Cardiovascular exam: PRESENT: +S1, +S2


GI/Abdominal exam: PRESENT: soft


Neurological exam: PRESENT: alert





Results


Laboratory Results: 


 





 11/10/17 04:10 





 11/10/17 04:10 





 











  11/10/17 11/10/17 11/10/17





  04:10 04:10 13:40


 


WBC  4.4  


 


RBC  2.37 L  


 


Hgb  7.5 L  


 


Hct  22.1 L  


 


MCV  93  


 


MCH  31.6  


 


MCHC  33.9  


 


RDW  16.4 H  


 


Plt Count  159  


 


Seg Neutrophils %  75.9  


 


Lymphocytes %  15.5  


 


Monocytes %  6.6  


 


Eosinophils %  1.5  


 


Basophils %  0.5  


 


Absolute Neutrophils  3.3  


 


Absolute Lymphocytes  0.7  


 


Absolute Monocytes  0.3  


 


Absolute Eosinophils  0.1  


 


Absolute Basophils  0.0  


 


Sodium   140.8 


 


Potassium   3.6 


 


Chloride   104 


 


Carbon Dioxide   31 H 


 


Anion Gap   6 


 


BUN   6 L 


 


Creatinine   0.59 


 


Est GFR ( Amer)   > 60 


 


Est GFR (Non-Af Amer)   > 60 


 


Glucose   70 L 


 


Calcium   8.4 


 


Total Bilirubin   0.5 


 


AST   15 L 


 


ALT   26 


 


Alkaline Phosphatase   37 L 


 


Total Protein   3.8 L 


 


Albumin   2.1 L 


 


Blood Type    O POSITIVE


 


Antibody Screen    NEGATIVE











Impressions: 


 





Chest X-Ray  11/05/17 02:45


IMPRESSION:  SEVERE EMPHYSEMA.  NO ACUTE CARDIOPULMONARY PROCESS.  NO 

SIGNIFICANT CHANGE FROM PRIOR STUDY.


 














Assessment & Plan





- Diagnosis


(1) Gastrointestinal bleeding


Qualifiers: 


   GI bleed type/associated pathology: unspecified gastrointestinal hemorrhage 

type   Qualified Code(s): K92.2 - Gastrointestinal hemorrhage, unspecified   


Is this a current diagnosis for this admission?: Yes   





(2) Anemia due to acute blood loss


Is this a current diagnosis for this admission?: Yes   





(3) End stage chronic obstructive pulmonary disease


Is this a current diagnosis for this admission?: Yes   





(4) Undernutrition syndrome


Is this a current diagnosis for this admission?: Yes   





(5) Dieulafoy lesion of duodenum


Is this a current diagnosis for this admission?: Yes   





(6) Upper gastrointestinal bleeding


Is this a current diagnosis for this admission?: Yes   





(7) Weight loss


Is this a current diagnosis for this admission?: Yes

## 2017-11-11 NOTE — PDOC PROGRESS REPORT
Subjective


Progress Note for:: 11/11/17


Subjective:: 





Patient is back on regular diet so far tolerating regular diet without any 

complications, no more bloody stools.  Was transfused with packed red blood 

cells yesterday





Physical Exam


Vital Signs: 


 











Temp Pulse Resp BP Pulse Ox


 


 99.3 F   89   20   104/57 L  100 


 


 11/11/17 11:54  11/11/17 11:54  11/11/17 11:54  11/11/17 11:54  11/11/17 11:54








 Intake & Output











 11/10/17 11/11/17 11/12/17





 06:59 06:59 06:59


 


Intake Total 2477 2977 


 


Output Total 1775 1425 


 


Balance 702 1552 











General appearance: PRESENT: no acute distress


Eye exam: PRESENT: PERRLA


Respiratory exam: PRESENT: clear to auscultation rivera


Cardiovascular exam: PRESENT: +S1, +S2


GI/Abdominal exam: PRESENT: soft


Neurological exam: PRESENT: alert





Results


Laboratory Results: 


 





 11/11/17 07:35 





 11/10/17 04:10 





 











  11/10/17 11/11/17





  13:40 07:35


 


WBC   3.7 L


 


RBC   2.72 L


 


Hgb   8.5 L


 


Hct   25.3 L


 


MCV   93


 


MCH   31.4


 


MCHC   33.7


 


RDW   16.0 H


 


Plt Count   150


 


Seg Neutrophils %   78.1 H


 


Lymphocytes %   13.8


 


Monocytes %   6.1


 


Eosinophils %   1.6


 


Basophils %   0.4


 


Absolute Neutrophils   2.9


 


Absolute Lymphocytes   0.5


 


Absolute Monocytes   0.2


 


Absolute Eosinophils   0.1


 


Absolute Basophils   0.0


 


Blood Type  O POSITIVE 


 


Antibody Screen  NEGATIVE 











Impressions: 


 





Chest X-Ray  11/05/17 02:45


IMPRESSION:  SEVERE EMPHYSEMA.  NO ACUTE CARDIOPULMONARY PROCESS.  NO 

SIGNIFICANT CHANGE FROM PRIOR STUDY.


 














Assessment & Plan





- Diagnosis


(1) Gastrointestinal bleeding


Qualifiers: 


   GI bleed type/associated pathology: unspecified gastrointestinal hemorrhage 

type   Qualified Code(s): K92.2 - Gastrointestinal hemorrhage, unspecified   


Is this a current diagnosis for this admission?: Yes   





(2) Anemia due to acute blood loss


Is this a current diagnosis for this admission?: Yes   





(3) End stage chronic obstructive pulmonary disease


Is this a current diagnosis for this admission?: Yes   





(4) Undernutrition syndrome


Is this a current diagnosis for this admission?: Yes   





(5) Dieulafoy lesion of duodenum


Is this a current diagnosis for this admission?: Yes   





(6) Upper gastrointestinal bleeding


Is this a current diagnosis for this admission?: Yes   





(7) Weight loss


Is this a current diagnosis for this admission?: Yes

## 2017-11-12 NOTE — PDOC PROGRESS REPORT
Subjective


Progress Note for:: 11/12/17


Subjective:: 





Patient is seen by the bedside, the food  was advanced yesterday to regular diet





Physical Exam


Vital Signs: 


 











Temp Pulse Resp BP Pulse Ox


 


 98.4 F   101 H  22 H  118/66   99 


 


 11/12/17 11:48  11/12/17 11:48  11/12/17 11:48  11/12/17 11:48  11/12/17 11:48








 Intake & Output











 11/11/17 11/12/17 11/13/17





 06:59 06:59 06:59


 


Intake Total 2977 1750 


 


Output Total 1425 780 


 


Balance 1552 970 











General appearance: PRESENT: no acute distress


Head exam: PRESENT: atraumatic


Eye exam: PRESENT: PERRLA


Neck exam: PRESENT: full ROM


Respiratory exam: PRESENT: clear to auscultation rivera


Cardiovascular exam: PRESENT: RRR, +S1, +S2


Vascular exam: PRESENT: normal capillary refill


GI/Abdominal exam: PRESENT: normal bowel sounds, soft


Rectal exam: PRESENT: deferred


Neurological exam: PRESENT: alert


Skin exam: PRESENT: dry, intact, warm





Results


Laboratory Results: 


 





 11/12/17 08:24 





 11/10/17 04:10 





 











  11/12/17





  08:24


 


WBC  6.6


 


RBC  2.94 L


 


Hgb  9.3 L


 


Hct  27.5 L


 


MCV  94


 


MCH  31.8


 


MCHC  33.9


 


RDW  15.8 H


 


Plt Count  165











Impressions: 


 





Chest X-Ray  11/05/17 02:45


IMPRESSION:  SEVERE EMPHYSEMA.  NO ACUTE CARDIOPULMONARY PROCESS.  NO 

SIGNIFICANT CHANGE FROM PRIOR STUDY.


 














Assessment & Plan





- Diagnosis


(1) Gastrointestinal bleeding


Qualifiers: 


   GI bleed type/associated pathology: unspecified gastrointestinal hemorrhage 

type   Qualified Code(s): K92.2 - Gastrointestinal hemorrhage, unspecified   


Is this a current diagnosis for this admission?: Yes   





(2) Anemia due to acute blood loss


Is this a current diagnosis for this admission?: Yes   





(3) End stage chronic obstructive pulmonary disease


Is this a current diagnosis for this admission?: Yes   





(4) Undernutrition syndrome


Is this a current diagnosis for this admission?: Yes   





(5) Dieulafoy lesion of duodenum


Is this a current diagnosis for this admission?: Yes   





(6) Upper gastrointestinal bleeding


Is this a current diagnosis for this admission?: Yes   





(7) Weight loss


Is this a current diagnosis for this admission?: Yes

## 2017-11-13 NOTE — RADIOLOGY REPORT (SQ)
EXAM DESCRIPTION:  CT CHEST WITHOUT



COMPLETED DATE/TIME:  11/13/2017 6:56 pm



REASON FOR STUDY:  copd



COMPARISON:  Chest radiograph 11/5/2017



TECHNIQUE:  CT scan performed of the chest without intravenous contrast.  Images reviewed with lung, 
soft tissue and bone windows.  Reconstructed coronal and sagittal MPR images reviewed.  All images st
ored on PACS.

All CT scanners at this facility use dose modulation, iterative reconstruction, and/or weight based d
osing when appropriate to reduce radiation dose to as low as reasonably achievable (ALARA).

[facility]: Dose Right  [facility]: CareDose    [facility]: Dose Right    [facility]: Teradose 4D    [facility]: Smart Technologies



RADIATION DOSE:  Up-to-date CT equipment and radiation dose reduction techniques were employed. CTDIv
ol: 11.2 mGy. DLP: 504 mGy-cm. mGy.



LIMITATIONS:  No technical limitations.



FINDINGS:  LUNGS AND PLEURA: Severe bullous emphysema particularly in the upper lobes.  There are rivera
ateral pleural effusions left greater than right with basilar compressive atelectasis.  No pneumothor
ax.

HILAR AND MEDIASTINAL STRUCTURES: No identified masses or abnormal nodes.  No obvious aneurysm.

HEART AND VASCULAR STRUCTURES: No aneurysm.  Small pericardial effusion.

UPPER ABDOMEN: Ascites.

THYROID AND OTHER SOFT TISSUES: No masses.  No adenopathy.

BONES: No significant finding.

HARDWARE: None in the chest.

OTHER: No other significant findings.



IMPRESSION:  Severe emphysema.

Bilateral pleural effusions left greater than right.

Small pericardial effusion.

Abdominal ascites.



TECHNICAL DOCUMENTATION:  JOB ID:  [id number]

Quality ID # [id number]: Final reports with documentation of one or more dose reduction techniques (e.g., Au
tomated exposure control, adjustment of the mA and/or kV according to patient size, use of iterative 
reconstruction technique)

 2011 [employer]- All Rights Reserved

## 2017-11-13 NOTE — PDOC PROGRESS REPORT
Subjective


Progress Note for:: 11/13/17


Subjective:: 





Patient was seen by the bedside, he said does not feel well, he has severe COPD

, CT chest was done without contrast, it showed severe bullous emphysema 

particularly in the upper lobes.  There are bilateral pleural effusions left 

more than right with basilar compressive atelectasis, also found was abdominal 

ascites.





Physical Exam


Vital Signs: 


 











Temp Pulse Resp BP Pulse Ox


 


 98.7 F   91   24 H  135/67 H  99 


 


 11/13/17 15:19  11/13/17 15:19  11/13/17 15:19  11/13/17 15:19  11/13/17 15:19








 Intake & Output











 11/12/17 11/13/17 11/14/17





 06:59 06:59 06:59


 


Intake Total 1750 3010 2258


 


Output Total 780 790 520


 


Balance 970 2220 1738











General appearance: PRESENT: severe distress, thin


Eye exam: PRESENT: PERRLA


Respiratory exam: PRESENT: decreased breath sounds


Cardiovascular exam: PRESENT: +S1, +S2


GI/Abdominal exam: PRESENT: soft


Extremities exam: PRESENT: other - Bilateral leg edema


Neurological exam: PRESENT: alert, CN II-XII grossly intact





Results


Laboratory Results: 


 





 11/12/17 08:24 





 11/10/17 04:10 








Impressions: 


 





Chest X-Ray  11/05/17 02:45


IMPRESSION:  SEVERE EMPHYSEMA.  NO ACUTE CARDIOPULMONARY PROCESS.  NO 

SIGNIFICANT CHANGE FROM PRIOR STUDY.


 














Assessment & Plan





- Diagnosis


(1) Gastrointestinal bleeding


Qualifiers: 


   GI bleed type/associated pathology: unspecified gastrointestinal hemorrhage 

type   Qualified Code(s): K92.2 - Gastrointestinal hemorrhage, unspecified   


Is this a current diagnosis for this admission?: Yes   





(2) Anemia due to acute blood loss


Is this a current diagnosis for this admission?: Yes   





(3) End stage chronic obstructive pulmonary disease


Is this a current diagnosis for this admission?: Yes   





(4) Undernutrition syndrome


Is this a current diagnosis for this admission?: Yes   





(5) Dieulafoy lesion of duodenum


Is this a current diagnosis for this admission?: Yes   





(6) Upper gastrointestinal bleeding


Is this a current diagnosis for this admission?: Yes   





(7) Weight loss


Is this a current diagnosis for this admission?: Yes   





(8) Pleural effusion, bilateral


Is this a current diagnosis for this admission?: Yes   


Plan: 


Thoracentesis is scheduled for the morning, patient to be transferred to 

intermediate care unit








(9) Bullous emphysema


Is this a current diagnosis for this admission?: Yes   





(10) Hypoalbuminemia


Is this a current diagnosis for this admission?: Yes   


Plan: 


The hypoalbuminemia is most likely from undernutrition, there is no proteinuria 

to suggest kidney loss of albumin, the liver function is normal so the most 

likely explanation is inadequate GI intake hypoalbuminemia is an independent 

predictor of mortality








(11) Anasarca


Is this a current diagnosis for this admission?: Yes   


Plan: 


Start furosemide infusion

## 2017-11-14 NOTE — PDOC PROGRESS REPORT
Subjective


Progress Note for:: 11/14/17


Subjective:: 





Patient was seen by the bedside, he had thoracentesis done today, about 500 cc 

of pleural fluid was collected





Physical Exam


Vital Signs: 


 











Temp Pulse Resp BP Pulse Ox


 


 98.8 F   110 H  16   99/52 L  100 


 


 11/14/17 15:28  11/14/17 15:28  11/14/17 15:28  11/14/17 15:28  11/14/17 17:05








 Intake & Output











 11/13/17 11/14/17 11/15/17





 06:59 06:59 06:59


 


Intake Total 3010 2926 


 


Output Total 790 2390 


 


Balance 2220 536 











Head exam: PRESENT: atraumatic, normocephalic


Eye exam: PRESENT: conjunctiva pink, EOMI, PERRLA


Ear exam: PRESENT: normal external ear exam


Mouth exam: PRESENT: moist, tongue midline


Neck exam: PRESENT: full ROM


Respiratory exam: PRESENT: decreased breath sounds


Cardiovascular exam: PRESENT: RRR, +S1, +S2


Pulses: PRESENT: normal dorsalis pedis pul, +2 pedal pulses bilateral


Vascular exam: PRESENT: normal capillary refill


GI/Abdominal exam: PRESENT: normal bowel sounds, soft


Rectal exam: PRESENT: deferred


Neurological exam: PRESENT: alert, awake, oriented to person, oriented to place

, oriented to time, oriented to situation, CN II-XII grossly intact


Psychiatric exam: PRESENT: appropriate affect, normal mood


Skin exam: PRESENT: dry, intact, warm





Results


Laboratory Results: 


 





 11/14/17 06:00 





 11/14/17 06:00 





 











  11/13/17 11/13/17 11/13/17





  18:20 19:00 19:00


 


WBC   4.8 


 


RBC   3.21 L 


 


Hgb   10.2 L 


 


Hct   29.6 L 


 


MCV   92 


 


MCH   31.7 


 


MCHC   34.3 


 


RDW   15.3 H 


 


Plt Count   162 


 


Seg Neutrophils %   80.8 H 


 


Lymphocytes %   12.9 L 


 


Monocytes %   4.3 


 


Eosinophils %   1.5 


 


Basophils %   0.5 


 


Absolute Neutrophils   3.9 


 


Absolute Lymphocytes   0.6 


 


Absolute Monocytes   0.2 


 


Absolute Eosinophils   0.1 


 


Absolute Basophils   0.0 


 


Carbonic Acid  1.91 H  


 


HCO3/H2CO3 Ratio  17:1  


 


ABG pH  7.34 L  


 


ABG pCO2  63.4 H  


 


ABG pO2  103.6 H  


 


ABG HCO3  33.1 H  


 


ABG O2 Saturation  97.2  


 


ABG Base Excess  5.9  


 


FiO2  2L  


 


Sodium    142.3


 


Potassium    3.5 L


 


Chloride    104


 


Carbon Dioxide    31 H


 


Anion Gap    7


 


BUN    4 L


 


Creatinine    0.64


 


Est GFR ( Amer)    > 60


 


Est GFR (Non-Af Amer)    > 60


 


Glucose    72 L


 


Calcium    8.6


 


Total Bilirubin    1.0


 


AST    18


 


ALT    23


 


Alkaline Phosphatase    51


 


Total Protein    4.3 L


 


Albumin    2.6 L


 


Fluid Type   


 


Fluid Source   


 


Fluid Color   


 


Fluid Appearance   


 


Fluid Viscosity   


 


Fluid WBC   


 


Fluid RBC   














  11/14/17 11/14/17 11/14/17





  06:00 06:00 12:02


 


WBC  5.5  


 


RBC  3.12 L  


 


Hgb  9.9 L  


 


Hct  28.9 L  


 


MCV  93  


 


MCH  31.9  


 


MCHC  34.4  


 


RDW  15.1 H  


 


Plt Count  152  


 


Seg Neutrophils %  86.9 H  


 


Lymphocytes %  7.4 L  


 


Monocytes %  4.5  


 


Eosinophils %  0.8  


 


Basophils %  0.4  


 


Absolute Neutrophils  4.8  


 


Absolute Lymphocytes  0.4 L  


 


Absolute Monocytes  0.2  


 


Absolute Eosinophils  0.0  


 


Absolute Basophils  0.0  


 


Carbonic Acid   


 


HCO3/H2CO3 Ratio   


 


ABG pH   


 


ABG pCO2   


 


ABG pO2   


 


ABG HCO3   


 


ABG O2 Saturation   


 


ABG Base Excess   


 


FiO2   


 


Sodium   144.1 


 


Potassium   3.3 L 


 


Chloride   101 


 


Carbon Dioxide   37 H 


 


Anion Gap   6 


 


BUN   4 L 


 


Creatinine   0.61 


 


Est GFR ( Amer)   > 60 


 


Est GFR (Non-Af Amer)   > 60 


 


Glucose   82 


 


Calcium   8.5 


 


Total Bilirubin   0.8 


 


AST   31 


 


ALT   26 


 


Alkaline Phosphatase   48 


 


Total Protein   4.7 L 


 


Albumin   2.6 L 


 


Fluid Type    PLEURAL


 


Fluid Source    


 


Fluid Color    STRAW


 


Fluid Appearance    SLIGHTLY HAZY


 


Fluid Viscosity    LIQUID


 


Fluid WBC    100


 


Fluid RBC    35








 











  11/13/17 11/13/17 11/14/17





  21:49 21:49 06:00


 


Creatine Kinase   21 L  27 L


 


CK-MB (CK-2)  0.57  


 


Troponin I  < 0.012  


 


NT-Pro-B Natriuret Pep  170  














  11/14/17 11/14/17 11/14/17





  06:00 14:04 14:04


 


Creatine Kinase   < 20 L 


 


CK-MB (CK-2)  0.70   0.67


 


Troponin I  < 0.012   < 0.012


 


NT-Pro-B Natriuret Pep   











Impressions: 


 





Chest CT  11/13/17 00:00


IMPRESSION:  Severe emphysema.


Bilateral pleural effusions left greater than right.


Small pericardial effusion.


Abdominal ascites.


 








Thoracentesis Ultrasound  11/13/17 21:28


IMPRESSION:  SUCCESSFUL THORACENTESIS USING ultrasound GUIDANCE.


 








Abdomen/Pelvis CT  11/14/17 00:00


IMPRESSION:  1.  Bilateral pleural effusions.  Reduced volume in the left 

hemithorax as described.  Has the patient had a partial pneumonectomy?


2.  Ascites and subcutaneous edema.  The appearance of the liver is not 

particularly suggestive of cirrhosis.  Correlate clinically.


3.  Atherosclerosis.


4.  Osseous findings as described.


 








Chest X-Ray  11/14/17 00:00


IMPRESSION:  There is no pneumothorax.  Chronic lung changes with no acute 

cardiopulmonary disease.


 














Assessment & Plan





- Diagnosis


(1) Gastrointestinal bleeding


Qualifiers: 


   GI bleed type/associated pathology: unspecified gastrointestinal hemorrhage 

type   Qualified Code(s): K92.2 - Gastrointestinal hemorrhage, unspecified   


Is this a current diagnosis for this admission?: Yes   





(2) Anemia due to acute blood loss


Is this a current diagnosis for this admission?: Yes   





(3) End stage chronic obstructive pulmonary disease


Is this a current diagnosis for this admission?: Yes   





(4) Undernutrition syndrome


Is this a current diagnosis for this admission?: Yes   





(5) Dieulafoy lesion of duodenum


Is this a current diagnosis for this admission?: Yes   





(6) Upper gastrointestinal bleeding


Is this a current diagnosis for this admission?: Yes   





(7) Weight loss


Is this a current diagnosis for this admission?: Yes   





(8) Pleural effusion, bilateral


Is this a current diagnosis for this admission?: Yes   





(9) Bullous emphysema


Is this a current diagnosis for this admission?: Yes   





(10) Hypoalbuminemia


Is this a current diagnosis for this admission?: Yes   





(11) Anasarca


Is this a current diagnosis for this admission?: Yes

## 2017-11-14 NOTE — RADIOLOGY REPORT (SQ)
EXAM DESCRIPTION:  CHEST SINGLE VIEW



COMPLETED DATE/TIME:  11/14/2017 12:23 pm



REASON FOR STUDY:  S/P LT THORACENTESIS



COMPARISON:  Chest CT scan dated 11/13/2017



EXAM PARAMETERS:  NUMBER OF VIEWS: One view.

TECHNIQUE: Single frontal radiographic view of the chest acquired.

RADIATION DOSE: NA

LIMITATIONS: None.



FINDINGS:  LUNGS AND PLEURA: No evidence for pneumothorax post thoracentesis.  Chronic emphysematous 
changes are again identified.  Again there is evidence for obstructive lung disease.  No acute consol
idations are identified.  Interval decrease in size of the left pleural effusion.

MEDIASTINUM AND HILAR STRUCTURES: No masses.  Contour normal.

HEART AND VASCULAR STRUCTURES: Heart normal in size.  Normal vasculature.

BONES: No acute findings.

HARDWARE: None in the chest.

OTHER: No other significant finding.



IMPRESSION:  No evidence for pneumothorax post thoracentesis.  Other findings as noted above



TECHNICAL DOCUMENTATION:  JOB ID:  [id number]

 2011 [employer]- All Rights Reserved

## 2017-11-14 NOTE — XCELERA REPORT
[address] [address]

                             Tel: [phone]

                             Fax: [phone]



                    Transthoracic Echocardiogram Report

____________________________________________________________________________



Name: PEDRO REYES

MRN: [id number]                Age: 75 yrs

Gender: Male                   : 1942

Patient Status: Inpatient      Patient Location: [address] [place], [place] [address]

Account #: [id number]

Study Date: 2017 01:18 PM

Accession #: [id number]

____________________________________________________________________________



Height: 76 in        Weight: 124 lb        BSA: 1.8 m2



____________________________________________________________________________

Procedure: A complete two-dimensional transthoracic echocardiogram was

performed (2D, M-mode, spectral and color flow Doppler). The study was

technically difficult with many images being suboptimal in quality.

Reason For Study: chf





Ordering Physician: MIKE MCCULLOUGH

Performed By: Analisa Lozano

____________________________________________________________________________





Interpretation Summary

The left ventricular ejection fraction is normal.

There is mild concentric left ventricular hypertrophy.

Doppler measurements suggest pseudonormalized left ventricular relaxation,

which is associated with grade II/IV or mild to moderate diastolic

dysfunction

The left ventricle is grossly normal size.

Wall motion cannot be accurately commented on, but no definite regional

wall motion abnormalities noted.

The right ventricular systolic function is normal.

The left atrium is borderline dilated.

The right atrium is normal in size

There is a trace amount of mitral regurgitation

There is no mitral valve stenosis.

No aortic regurgitation is present.

There is no aortic valve stenosis

There is a trace to mild amount of tricuspid regurgitation

There is mild pulmonary hypertension by echo

Right ventricular systolic pressure is estimated to be elevated at 30-

40mmHg.

The aortic root is not well visualized but is probably normal size.

The inferior vena cava was not well visualized

Minimal pericardial effusion.



____________________________________________________________________________



MMode/2D Measurements & Calculations

RVDd: 4.5 cm   LVIDd: 3.9 cm  FS: 35.5 %            Ao root diam: 2.6 cm

IVSd: 0.86 cm  LVIDs: 2.5 cm  EDV(Teich): 66.6 ml

               LVPWd: 0.93 cm ESV(Teich): 22.9 ml   Ao root area: 5.2 cm2

                              EF(Teich): 65.6 %     LA dimension: 3.6 cm



Doppler Measurements & Calculations

MV E max andriy:      MV P1/2t max andriy:    Ao V2 max:       LV V1 max P.5 cm/sec        79.5 cm/sec          145.2 cm/sec     7.2 mmHg

MV A max andriy:      MV P1/2t: 73.1 msec  Ao max PG:       LV V1 max:

74.0 cm/sec                             8.4 mmHg         134.3 cm/sec

MV E/A: 1.1        MVA(P1/2t): 3.0 cm2

                   MV dec slope:

                   318.4 cm/sec2



        _____________________________________________________________

PA V2 max:         TR max andriy:

104.6 cm/sec       281.0 cm/sec

PA max PG:         TR max P.6 mmHg

4.4 mmHg

____________________________________________________________________________



Left Ventricle

The left ventricle is grossly normal size. There is mild concentric left

ventricular hypertrophy. The left ventricular ejection fraction is normal.

Doppler measurements suggest pseudonormalized left ventricular relaxation,

which is associated with grade II/IV or mild to moderate diastolic

dysfunction. Wall motion cannot be accurately commented on, but no definite

regional wall motion abnormalities noted.



Right Ventricle

The right ventricle is normal in size, thickness and function. There is

normal right ventricular wall thickness. The right ventricular systolic

function is normal.



Atria

The right atrium is normal in size. The left atrium is borderline dilated.

Interarterial septum not well visualized and not well dopplered. Cannot

comment on ASD/PFO presence.



Mitral Valve

The mitral valve leaflets are sclerotic and show some degree of functional

abnormality. There is no mitral valve stenosis. There is a trace amount of

mitral regurgitation.





Aortic Valve

The aortic valve is grossly normal. There is no aortic valve stenosis. No

aortic regurgitation is present.



Tricuspid Valve

The tricuspid valve is not well visualized, but is grossly normal. There is

no tricuspid stenosis. There is a trace to mild amount of tricuspid

regurgitation. There is mild pulmonary hypertension by echo. Right

ventricular systolic pressure is estimated to be elevated at 30-40mmHg.



Pulmonic Valve

The pulmonic valve is not well visualized.



Great Vessels

The aortic root is not well visualized but is probably normal size. The

inferior vena cava was not well visualized.



Effusions

Minimal pericardial effusion.





____________________________________________________________________________

Electronically signed by:      Franco Lazar      on 2017 08:10 PM



CC: MIKE MCCULLOUGH

>

Franco Lazar

## 2017-11-14 NOTE — RADIOLOGY REPORT (SQ)
EXAM DESCRIPTION:  CHEST SINGLE VIEW



COMPLETED DATE/TIME:  11/14/2017 2:36 pm



REASON FOR STUDY:  2 HOURS S/P LT THORACENTESIS



COMPARISON:  11/14/2017



EXAM PARAMETERS:  NUMBER OF VIEWS: One view.

TECHNIQUE: Single frontal radiographic view of the chest acquired.

RADIATION DOSE: NA

LIMITATIONS: None.



FINDINGS:  LUNGS AND PLEURA: The lungs are hyperexpanded.  There is no pneumothorax.  There is no inf
iltrate.  There is no residual effusion.

MEDIASTINUM AND HILAR STRUCTURES: No masses.  Contour normal.

HEART AND VASCULAR STRUCTURES: Heart normal in size.  Normal vasculature.

BONES: No acute findings.

HARDWARE: None in the chest.

OTHER: No other significant finding.



IMPRESSION:  There is no pneumothorax.  Chronic lung changes with no acute cardiopulmonary disease.



TECHNICAL DOCUMENTATION:  JOB ID:  [id number]

 2011 [employer]- All Rights Reserved

## 2017-11-14 NOTE — RADIOLOGY REPORT (SQ)
EXAM DESCRIPTION:  U/S THORACENTESIS WITH IMAGING



COMPLETED DATE/TIME:  11/14/2017 12:24 pm



REASON FOR STUDY:  pleural effusion



COMPARISON:  None.



RADIATION DOSE:  None



LIMITATIONS:  None.



PROCEDURE:  Procedure, risks, benefit, and alternative explained to patient who then gave written con
sent.  The left chest wall was marked using ultrasound guidance.  A time-out was called for correct m
arking verification. Chest prepped and draped using sterile technique.  Local anesthesia achieved usi
ng 10 ml of 1% lidocaine injection.  A 5 [place] thoracentesis catheter was introduced into the left p
leural space.  Fluid was aspirated.  The catheter was removed and the entry site was covered with cat
rile bandage.  No immediate complications noted.

Images acquired during the procedure were stored on PACS.



FINDINGS:  ENTRY SITE: Left hemithorax posteriorly

FLUID VOLUME: 550 mL

FLUID ANALYSIS: Straw-colored

OTHER: None



IMPRESSION:  SUCCESSFUL THORACENTESIS USING ultrasound GUIDANCE.



COMMENT:  Patient medication list reviewed: Yes

Quality ID #145: Final reports for procedures using fluoroscopy that document radiation exposure lm
cate, or exposure time and number of fluorographic images (if radiation exposure indices are not avail
able)



TECHNICAL DOCUMENTATION:  JOB ID: [id number]

 2011 [employer]- All Rights Reserved

## 2017-11-14 NOTE — RADIOLOGY REPORT (SQ)
EXAM DESCRIPTION:  CT ABD/PELVIS NO ORAL OR IV



COMPLETED DATE/TIME:  11/14/2017 10:19 am



REASON FOR STUDY:  Ascites r/o intraperitoneal pathology



COMPARISON:  6/7/2014



TECHNIQUE:  CT scan of the abdomen and pelvis performed without intravenous or oral contrast. Images 
reviewed with lung, soft tissue, and bone windows. Reconstructed coronal and sagittal MPR images revi
ewed. All images stored on PACS.

All CT scanners at this facility use dose modulation, iterative reconstruction, and/or weight based d
osing when appropriate to reduce radiation dose to as low as reasonably achievable (ALARA).

[facility]: Dose Right  [facility]: CareDose    [facility]: Dose Right    [facility]: Teradose 4D    [facility]: Smart Technologies



RADIATION DOSE:  Up-to-date CT equipment and radiation dose reduction techniques were employed. CTDIv
ol: 3.6 mGy. DLP: 184 mGy-cm.mGy.



LIMITATIONS:  None.



FINDINGS:  LOWER CHEST: Bilateral pleural effusions, left more than right.  Reduced volume in the lef
t hemithorax with shift of mediastinal structures to the left.  Centrilobular emphysematous changes.

NON-CONTRASTED LIVER, SPLEEN, ADRENALS: The liver, spleen, adrenal glands are normal.

PANCREAS: No masses. No peripancreatic inflammatory changes.

GALLBLADDER: Contracted.  No stones.

RIGHT KIDNEY AND URETER: No suspicious masses. Assessment limited by lack of IV contrast.   No signif
icant calcifications.   No hydronephrosis or hydroureter.

LEFT KIDNEY AND URETER: No suspicious masses. Assessment limited by lack of IV contrast.   No signifi
cant calcifications.   No hydronephrosis or hydroureter.

AORTA AND RETROPERITONEUM: No aneurysm.  There is atherosclerosis.  There is a prominent plaque at th
e origin of the superior mesenteric artery.

BOWEL AND PERITONEAL CAVITY: No obvious masses or inflammatory changes. No free fluid.

APPENDIX: Not identified.

PELVIS, BLADDER, AND ABDOMINAL WALL:No abnormal masses.  Urinary bladder is normal.  There is a moder
ate amount of free fluid in the pelvis.

BONES: L5-S1 degenerative disc changes

OTHER: There is considerable ascites around the liver and there is subcutaneous edema.



IMPRESSION:  1.  Bilateral pleural effusions.  Reduced volume in the left hemithorax as described.  H
as the patient had a partial pneumonectomy?

2.  Ascites and subcutaneous edema.  The appearance of the liver is not particularly suggestive of ci
rrhosis.  Correlate clinically.

3.  Atherosclerosis.

4.  Osseous findings as described.



COMMENT:  Quality ID # [id number]: Final reports with documentation of one or more dose reduction techniques
 (e.g., Automated exposure control, adjustment of the mA and/or kV according to patient size, use of 
iterative reconstruction technique)



TECHNICAL DOCUMENTATION:  JOB ID:  [id number]

 2011 [employer]- All Rights Reserved

## 2017-11-15 NOTE — PDOC PROGRESS REPORT
Subjective


Progress Note for:: 11/15/17


Subjective:: 


Patient of a bed offer at [facility] , he has electrolyte derangement 

with hypokalemia and contraction alkalosis.He has transudative pleural effusion 

with ascities due to hypoalbumoinenia 





Physical Exam


Vital Signs: 


 











Temp Pulse Resp BP Pulse Ox


 


 98.3 F   95   18   114/58 L  100 


 


 11/15/17 11:17  11/15/17 11:17  11/15/17 11:17  11/15/17 11:17  11/15/17 11:17








 Intake & Output











 11/14/17 11/15/17 11/16/17





 06:59 06:59 06:59


 


Intake Total 2926 1407 


 


Output Total 0320 2875 


 


Balance 536 -1468 


 


Weight  64.8 kg 











General appearance: PRESENT: thin


Head exam: PRESENT: atraumatic, normocephalic


Eye exam: PRESENT: PERRLA


Ear exam: PRESENT: normal external ear exam


Mouth exam: PRESENT: moist, tongue midline


Neck exam: PRESENT: full ROM


Respiratory exam: PRESENT: clear to auscultation rivera


Cardiovascular exam: PRESENT: RRR, +S1, +S2


Pulses: PRESENT: normal dorsalis pedis pul, +2 pedal pulses bilateral


Vascular exam: PRESENT: normal capillary refill


GI/Abdominal exam: PRESENT: normal bowel sounds, soft


Rectal exam: PRESENT: deferred


Neurological exam: PRESENT: alert


Psychiatric exam: PRESENT: appropriate affect, normal mood


Skin exam: PRESENT: dry, intact, warm





Results


Laboratory Results: 


 





 11/15/17 08:55 





 11/15/17 08:55 





 











  11/14/17 11/14/17 11/14/17





  12:02 12:02 12:02


 


WBC   


 


RBC   


 


Hgb   


 


Hct   


 


MCV   


 


MCH   


 


MCHC   


 


RDW   


 


Plt Count   


 


Seg Neutrophils %   


 


Lymphocytes %   


 


Monocytes %   


 


Eosinophils %   


 


Basophils %   


 


Absolute Neutrophils   


 


Absolute Lymphocytes   


 


Absolute Monocytes   


 


Absolute Eosinophils   


 


Absolute Basophils   


 


Sodium   


 


Potassium   


 


Chloride   


 


Carbon Dioxide   


 


Anion Gap   


 


BUN   


 


Creatinine   


 


Est GFR ( Amer)   


 


Est GFR (Non-Af Amer)   


 


Glucose   


 


Calcium   


 


Total Bilirubin   


 


AST   


 


ALT   


 


Alkaline Phosphatase   


 


Total Protein   


 


Albumin   


 


Fluid Glucose   87 


 


Fluid Total Protein    1.0


 


Fluid LDH    44


 


Fluid Amylase  15  














  11/15/17 11/15/17





  08:55 08:55


 


WBC  3.7 L 


 


RBC  2.96 L 


 


Hgb  9.3 L 


 


Hct  27.3 L 


 


MCV  92 


 


MCH  31.3 


 


MCHC  33.9 


 


RDW  15.0 H 


 


Plt Count  130 L 


 


Seg Neutrophils %  80.1 H 


 


Lymphocytes %  11.7 L 


 


Monocytes %  6.3 


 


Eosinophils %  1.5 


 


Basophils %  0.4 


 


Absolute Neutrophils  2.9 


 


Absolute Lymphocytes  0.4 L 


 


Absolute Monocytes  0.2 


 


Absolute Eosinophils  0.1 


 


Absolute Basophils  0.0 


 


Sodium   142.3


 


Potassium   3.0 L*


 


Chloride   95 L


 


Carbon Dioxide   42 H*


 


Anion Gap   5


 


BUN   4 L


 


Creatinine   0.64


 


Est GFR ( Amer)   > 60


 


Est GFR (Non-Af Amer)   > 60


 


Glucose   86


 


Calcium   8.3 L


 


Total Bilirubin   0.6


 


AST   19


 


ALT   27


 


Alkaline Phosphatase   42


 


Total Protein   4.0 L


 


Albumin   2.3 L


 


Fluid Glucose  


 


Fluid Total Protein  


 


Fluid LDH  


 


Fluid Amylase  








 











  11/13/17 11/13/17 11/14/17





  21:49 21:49 06:00


 


Creatine Kinase   21 L  27 L


 


CK-MB (CK-2)  0.57  


 


Troponin I  < 0.012  


 


NT-Pro-B Natriuret Pep  170  














  11/14/17 11/14/17 11/14/17





  06:00 14:04 14:04


 


Creatine Kinase   < 20 L 


 


CK-MB (CK-2)  0.70   0.67


 


Troponin I  < 0.012   < 0.012


 


NT-Pro-B Natriuret Pep   











Impressions: 


 





Chest CT  11/13/17 00:00


IMPRESSION:  Severe emphysema.


Bilateral pleural effusions left greater than right.


Small pericardial effusion.


Abdominal ascites.


 








Thoracentesis Ultrasound  11/13/17 21:28


IMPRESSION:  SUCCESSFUL THORACENTESIS USING ultrasound GUIDANCE.


 








Abdomen/Pelvis CT  11/14/17 00:00


IMPRESSION:  1.  Bilateral pleural effusions.  Reduced volume in the left 

hemithorax as described.  Has the patient had a partial pneumonectomy?


2.  Ascites and subcutaneous edema.  The appearance of the liver is not 

particularly suggestive of cirrhosis.  Correlate clinically.


3.  Atherosclerosis.


4.  Osseous findings as described.


 








Chest X-Ray  11/14/17 00:00


IMPRESSION:  No evidence for pneumothorax post thoracentesis.  Other findings 

as noted above


 














Assessment & Plan





- Diagnosis


(1) Gastrointestinal bleeding


Qualifiers: 


   GI bleed type/associated pathology: unspecified gastrointestinal hemorrhage 

type   Qualified Code(s): K92.2 - Gastrointestinal hemorrhage, unspecified   


Is this a current diagnosis for this admission?: Yes   





(2) Anemia due to acute blood loss


Is this a current diagnosis for this admission?: Yes   





(3) End stage chronic obstructive pulmonary disease


Is this a current diagnosis for this admission?: Yes   





(4) Undernutrition syndrome


Is this a current diagnosis for this admission?: Yes   





(5) Dieulafoy lesion of duodenum


Is this a current diagnosis for this admission?: Yes   





(6) Upper gastrointestinal bleeding


Is this a current diagnosis for this admission?: Yes   





(7) Weight loss


Is this a current diagnosis for this admission?: Yes   





(8) Pleural effusion, bilateral


Is this a current diagnosis for this admission?: Yes   





(9) Bullous emphysema


Is this a current diagnosis for this admission?: Yes   





(10) Hypoalbuminemia


Is this a current diagnosis for this admission?: Yes   





(11) Anasarca


Is this a current diagnosis for this admission?: Yes   





(12) Hypokalemia


Is this a current diagnosis for this admission?: Yes   


Plan: 


[email or web address]

## 2017-11-16 NOTE — PDOC PROGRESS REPORT
Subjective


Progress Note for:: 11/16/17


Subjective:: 





Patient seen by the bedside, still have low potassium, plan is to transfer the 

nursing home in the morning. 





Physical Exam


Vital Signs: 


 











Temp Pulse Resp BP Pulse Ox


 


 99.0 F   97   18   95/45 L  100 


 


 11/16/17 15:51  11/16/17 15:51  11/16/17 15:51  11/16/17 15:51  11/16/17 15:51








 Intake & Output











 11/15/17 11/16/17 11/17/17





 06:59 06:59 06:59


 


Intake Total 1407 1578 0


 


Output Total 2875 500 225


 


Balance -1468 1078 -225


 


Weight 64.8 kg  











General appearance: PRESENT: thin


Eye exam: ABSENT: scleral icterus


Mouth exam: PRESENT: moist, tongue midline


Neck exam: PRESENT: full ROM


Respiratory exam: PRESENT: decreased breath sounds


Cardiovascular exam: PRESENT: RRR, +S1, +S2


Pulses: PRESENT: normal dorsalis pedis pul, +2 pedal pulses bilateral


Vascular exam: PRESENT: normal capillary refill


GI/Abdominal exam: PRESENT: normal bowel sounds, soft


Rectal exam: PRESENT: deferred


Neurological exam: PRESENT: alert


Psychiatric exam: ABSENT: homicidal ideation, suicidal ideation


Skin exam: PRESENT: dry, intact, warm.  ABSENT: cyanosis, rash





Results


Laboratory Results: 


 





 11/15/17 08:55 





 11/16/17 04:12 





 











  11/16/17





  04:12


 


Sodium  143.7


 


Potassium  3.3 L


 


Chloride  97 L


 


Carbon Dioxide  45 H*


 


Anion Gap  2 L


 


BUN  7


 


Creatinine  0.65


 


Est GFR ( Amer)  > 60


 


Est GFR (Non-Af Amer)  > 60


 


Glucose  86


 


Calcium  8.4


 


Total Bilirubin  0.6


 


AST  16 L


 


ALT  31


 


Alkaline Phosphatase  40


 


Total Protein  4.0 L


 


Albumin  2.2 L








 





11/14/17 12:02   Pleural Fluid - Not Specified   Fungal Smear - Final


11/14/17 12:02   Pleural Fluid - Not Specified   Fungal Smear - Final


11/14/17 12:02   Pleural Fluid - Not Specified   AFB Smear Concentration - Final


11/14/17 12:02   Pleural Fluid - Not Specified   Acid Fast Bacilli Smear - Final





 











  11/13/17 11/13/17 11/14/17





  21:49 21:49 06:00


 


Creatine Kinase   21 L  27 L


 


CK-MB (CK-2)  0.57  


 


Troponin I  < 0.012  


 


NT-Pro-B Natriuret Pep  170  














  11/14/17 11/14/17 11/14/17





  06:00 14:04 14:04


 


Creatine Kinase   < 20 L 


 


CK-MB (CK-2)  0.70   0.67


 


Troponin I  < 0.012   < 0.012


 


NT-Pro-B Natriuret Pep   











Impressions: 


 





Chest CT  11/13/17 00:00


IMPRESSION:  Severe emphysema.


Bilateral pleural effusions left greater than right.


Small pericardial effusion.


Abdominal ascites.


 








Thoracentesis Ultrasound  11/13/17 21:28


IMPRESSION:  SUCCESSFUL THORACENTESIS USING ultrasound GUIDANCE.


 








Abdomen/Pelvis CT  11/14/17 00:00


IMPRESSION:  1.  Bilateral pleural effusions.  Reduced volume in the left 

hemithorax as described.  Has the patient had a partial pneumonectomy?


2.  Ascites and subcutaneous edema.  The appearance of the liver is not 

particularly suggestive of cirrhosis.  Correlate clinically.


3.  Atherosclerosis.


4.  Osseous findings as described.


 








Chest X-Ray  11/14/17 00:00


IMPRESSION:  No evidence for pneumothorax post thoracentesis.  Other findings 

as noted above


 














Assessment & Plan





- Diagnosis


(1) Gastrointestinal bleeding


Qualifiers: 


   GI bleed type/associated pathology: unspecified gastrointestinal hemorrhage 

type   Qualified Code(s): K92.2 - Gastrointestinal hemorrhage, unspecified   


Is this a current diagnosis for this admission?: Yes   





(2) Anemia due to acute blood loss


Is this a current diagnosis for this admission?: Yes   





(3) End stage chronic obstructive pulmonary disease


Is this a current diagnosis for this admission?: Yes   





(4) Undernutrition syndrome


Is this a current diagnosis for this admission?: Yes   





(5) Dieulafoy lesion of duodenum


Is this a current diagnosis for this admission?: Yes   





(6) Upper gastrointestinal bleeding


Is this a current diagnosis for this admission?: Yes   





(7) Weight loss


Is this a current diagnosis for this admission?: Yes   





(8) Pleural effusion, bilateral


Is this a current diagnosis for this admission?: Yes   





(9) Bullous emphysema


Is this a current diagnosis for this admission?: Yes   





(10) Hypoalbuminemia


Is this a current diagnosis for this admission?: Yes   





(11) Anasarca


Is this a current diagnosis for this admission?: Yes   





(12) Hypokalemia


Is this a current diagnosis for this admission?: Yes   





- Plan Summary


Plan Summary: 





Potassium to be replaced, discharge to skilled nursing home in the morning

## 2017-11-16 NOTE — PDOC TRANSFER SUMMARY
General





- Admit/Disc Date/PCP


Admission Date/Primary Care Provider: 


  11/05/17 05:58





  





Discharge Date: 11/17/17





- Discharge Diagnosis


(1) Gastrointestinal bleeding


Is this a current diagnosis for this admission?: Yes   





(2) Anemia due to acute blood loss


Is this a current diagnosis for this admission?: Yes   





(3) End stage chronic obstructive pulmonary disease


Is this a current diagnosis for this admission?: Yes   





(4) Undernutrition syndrome


Is this a current diagnosis for this admission?: Yes   





(5) Dieulafoy lesion of duodenum


Is this a current diagnosis for this admission?: Yes   





(6) Upper gastrointestinal bleeding


Is this a current diagnosis for this admission?: Yes   





(7) Weight loss


Is this a current diagnosis for this admission?: Yes   





(8) Pleural effusion, bilateral


Is this a current diagnosis for this admission?: Yes   





(9) Bullous emphysema


Is this a current diagnosis for this admission?: Yes   





(10) Hypoalbuminemia


Is this a current diagnosis for this admission?: Yes   





(11) Anasarca


Is this a current diagnosis for this admission?: Yes   





(12) Hypokalemia


Is this a current diagnosis for this admission?: Yes   





- Additional Information


Resuscitation Status: Full Code


Home Medications: 








Diltiazem HCl [Diltiazem 24Hr ER] 180 mg PO DAILY 07/14/17 


Esomeprazole Mag Trihydrate [Nexium] 40 mg PO DAILY 07/14/17 


Finasteride [Proscar 5 mg Tablet] 5 mg PO DAILY 07/14/17 


Fluticasone/Salmeterol [Advair 250-50 Diskus 28 dose] 1 puff IH Q12 07/14/17 


Montelukast Sodium [Singulair 10 mg Tablet] 10 mg PO QPM 07/14/17 


Sertraline HCl [Zoloft 50 mg Tablet] 50 mg PO DAILY 07/14/17 


Tamsulosin HCl [Flomax 0.4 mg Cap.sr] 0.8 mg PO QHS 07/14/17 


Tiotropium Bromide [Spiriva Handihaler 18 mcg/dose (30 Dose)] 1 cap IH DAILY 07/ 14/17 


Albuterol Sulfate [Proair HFA Inhalation Aerosol 8.5 gm MDI] 2 puff IH Q4HP PRN 

11/05/17 


Megestrol Acetate 800 mg PO DAILY 11/05/17 


Roflumilast [Daliresp 500 mcg Tablet] 500 mcg PO DAILY 11/05/17 


Furosemide [Lasix 40 mg Tablet] 40 mg PO QAM #30 tablet 11/16/17 


Potassium Chloride [Klor-Con 10 Meq Tablet.sa] 40 meq PO DAILY  tablet.sa 11/16/ 17 











History of Present Illness


Admission Date/PCP: 


  11/05/17 05:58





  


Patient presented to the emergency room on 11/05/20/2017 for evaluation of 

generalized weakness, shortness of breath and lightheadedness.  In the 

emergency room he was evaluated, he was found to have tachycardia, he has a 

history of chronic atrial fibrillation, the hemogram revealed hemoglobin 7.0.





Hospital Course


Hospital Course: 





Patient required multiple blood transfusion because of upper GI bleed, he had 

upper endoscopy and colonoscopy on 11/08/2017, the upper endoscopy showed 

active bleeding in the third part of duodenum without any mucosa lesion, the 

colonoscopy was limited because of poor prep was found to have sigmoid 

diverticulosis.  Hospital course was complicated with shortness of breath, CT 

chest without contrast was obtained, he has a history of severe bullous 

emphysema, CT chest showed severe bullous emphysema with bilateral pleural 

effusions left more than right also found with ascites.  He underwent 

thoracentesis, over 500 cc of pleural fluid was collected, it was transudative 

pleural fluid, 2D echo was done because of concern for CHF.  Echo showed 

preserved ejection fraction of left ventricle there was grade 2 diastolic 

dysfunction of the left ventricle also found was pulmonary hypertension on the 

basis of the increased right ventricular systolic pressure estimated at 30 

mmHg. -40 mmHg.  He has severe hypoalbuminemia due to undernutrition it was 

felt that the ascites and pleural effusion is secondary to the severe 

hypoalbuminemia he was treated with diuretic, furosemide.  Patient has been 

losing weight progressively for many months to years he has been extensively 

evaluated for the weight loss, he has had endoscopies and CAT scans with no 

mass found, it was felt that the weight loss is partly due to COPD, end-stage 

type.  The plan is to transfer patient to skilled nursing home for long-term 

stay.  He also had electrolyte derangement including low potassium requiring 

replacement therapy patient was on Pradaxa for his atrial fibrillation because 

of the upper GI bleed due to the extensive this was held on the plan is to 

revisit this in about a month and probably start on a different anticoagulant





Physical Exam


Vital Signs: 


 











Temp Pulse Resp BP Pulse Ox


 


 98.8 F   107 H  17   104/53 L  100 


 


 11/16/17 12:12  11/16/17 14:00  11/16/17 12:12  11/16/17 12:12  11/16/17 12:12








 Intake & Output











 11/15/17 11/16/17 11/17/17





 06:59 06:59 06:59


 


Intake Total 1407 1578 0


 


Output Total 2875 500 225


 


Balance -1468 1078 -225


 


Weight 64.8 kg  











General appearance: PRESENT: no acute distress


Head exam: PRESENT: normocephalic


Eye exam: PRESENT: PERRLA.  ABSENT: scleral icterus


Ear exam: PRESENT: normal external ear exam


Respiratory exam: PRESENT: decreased breath sounds


Cardiovascular exam: PRESENT: RRR, +S1, +S2


Pulses: PRESENT: normal dorsalis pedis pul


Vascular exam: PRESENT: normal capillary refill


GI/Abdominal exam: PRESENT: normal bowel sounds, soft


Rectal exam: PRESENT: deferred


Extremities exam: PRESENT: full ROM


Neurological exam: PRESENT: alert, CN II-XII grossly intact


Psychiatric exam: PRESENT: appropriate affect, normal mood


Skin exam: PRESENT: dry, intact, warm.  ABSENT: cyanosis, rash





Results


Laboratory Results: 


 





 11/15/17 08:55 





 11/16/17 04:12 





 











  11/16/17





  04:12


 


Sodium  143.7


 


Potassium  3.3 L


 


Chloride  97 L


 


Carbon Dioxide  45 H*


 


Anion Gap  2 L


 


BUN  7


 


Creatinine  0.65


 


Est GFR ( Amer)  > 60


 


Est GFR (Non-Af Amer)  > 60


 


Glucose  86


 


Calcium  8.4


 


Total Bilirubin  0.6


 


AST  16 L


 


ALT  31


 


Alkaline Phosphatase  40


 


Total Protein  4.0 L


 


Albumin  2.2 L








 





11/14/17 12:02   Pleural Fluid - Not Specified   Fungal Smear - Final


11/14/17 12:02   Pleural Fluid - Not Specified   Fungal Smear - Final


11/14/17 12:02   Pleural Fluid - Not Specified   AFB Smear Concentration - Final


11/14/17 12:02   Pleural Fluid - Not Specified   Acid Fast Bacilli Smear - Final





 











  11/13/17 11/13/17 11/14/17





  21:49 21:49 06:00


 


Creatine Kinase   21 L  27 L


 


CK-MB (CK-2)  0.57  


 


Troponin I  < 0.012  


 


NT-Pro-B Natriuret Pep  170  














  11/14/17 11/14/17 11/14/17





  06:00 14:04 14:04


 


Creatine Kinase   < 20 L 


 


CK-MB (CK-2)  0.70   0.67


 


Troponin I  < 0.012   < 0.012


 


NT-Pro-B Natriuret Pep   











Impressions: 


 





Chest CT  11/13/17 00:00


IMPRESSION:  Severe emphysema.


Bilateral pleural effusions left greater than right.


Small pericardial effusion.


Abdominal ascites.


 








Thoracentesis Ultrasound  11/13/17 21:28


IMPRESSION:  SUCCESSFUL THORACENTESIS USING ultrasound GUIDANCE.


 








Abdomen/Pelvis CT  11/14/17 00:00


IMPRESSION:  1.  Bilateral pleural effusions.  Reduced volume in the left 

hemithorax as described.  Has the patient had a partial pneumonectomy?


2.  Ascites and subcutaneous edema.  The appearance of the liver is not 

particularly suggestive of cirrhosis.  Correlate clinically.


3.  Atherosclerosis.


4.  Osseous findings as described.


 








Chest X-Ray  11/14/17 00:00


IMPRESSION:  No evidence for pneumothorax post thoracentesis.  Other findings 

as noted above

## 2018-10-13 NOTE — ER DOCUMENT REPORT
ED General





- General


Chief Complaint: Urinary Retention


Stated Complaint: PROBLEMS URINATING


Time Seen by Provider: 10/13/18 20:35


Notes: 





Patient is a 76-year-old male who currently resides in nursing facility who 

presents with difficulty with voiding.  Apparently his doctor had ordered a 

Quinonez catheter be placed at the nursing facility but they were unable to place 

the catheter successfully.  His doctor then ordered that he be transported to 

the emergency department.  The patient has had a Quinonez catheter placed at the 

time of my initial assessment, denies any going symptoms or concerns.  Reports 

a history of urinary retention in the past.  Denies any dysuria, abdominal pain

, flank pain, fever or constitutional symptoms.  He states that he had some 

mild sensations of bladder fullness prior to the Quinonez placement but those have 

completely resolved.  No obvious trigger for today's events.


TRAVEL OUTSIDE OF THE [place] IN LAST 30 DAYS: No





- Related Data


Allergies/Adverse Reactions: 


 





No Known Allergies Allergy (Verified 02/21/17 15:21)


 











Past Medical History





- General


Information source: Patient





- Social History


Smoking Status: Former Smoker


Frequency of alcohol use: None


Drug Abuse: None


Lives with: Nursing Home


Family History: Reviewed & Not Pertinent


Patient has suicidal ideation: No


Patient has homicidal ideation: No





- Past Medical History


Cardiac Medical History: Reports: Hx Atrial Fibrillation - paroxysmal, Hx 

Coronary Artery Disease, Hx Hypercholesterolemia, Hx Hypertension


   Denies: Hx Heart Attack


Pulmonary Medical History: Reports: Hx Asthma, Hx Bronchitis, Hx COPD, Hx 

Pneumonia - june 2012, Hx Respiratory Failure


   Denies: Hx Tuberculosis


Neurological Medical History: Denies: Hx Cerebrovascular Accident, Hx Seizures


Renal/ Medical History: Reports: Hx Benign Prostatic Hyperplasia, Hx Kidney 

Stones.  Denies: Hx Peritoneal Dialysis


GI Medical History: Reports: Hx Hiatal Hernia


Musculoskeletal Medical History: Reports Hx Arthritis


Psychiatric Medical History: Reports: Hx Depression


Past Surgical History: Reports: Hx Pacemaker





- Immunizations


Hx Diphtheria, Pertussis, Tetanus Vaccination: Yes


Hx Pneumococcal Vaccination: 01/01/11





Review of Systems





- Review of Systems


Notes: 





Constitutional: Negative for fever.


HENT: Negative for sore throat.


Eyes: Negative for visual changes.


Cardiovascular: Negative for chest pain.


Respiratory: Negative for shortness of breath.


Gastrointestinal: Negative for abdominal pain, vomiting or diarrhea.


Genitourinary: Positive for urinary retention


Musculoskeletal: Negative for back pain.


Skin: Negative for rash.


Neurological: Negative for headaches, weakness or numbness.





10 point ROS negative except as marked above and in HPI.





Physical Exam





- Vital signs


Vitals: 


 











Temp Pulse Resp BP Pulse Ox


 


 97.9 F   91   17   113/59 L  98 


 


 10/13/18 20:22  10/13/18 20:22  10/13/18 20:22  10/13/18 20:22  10/13/18 20:22











Notes: 





PHYSICAL EXAMINATION:





GENERAL: Well-appearing, well-nourished and in no acute distress.





HEAD: Atraumatic, normocephalic.





EYES: Pupils equal round and reactive to light, extraocular movements intact, 

sclera anicteric, conjunctiva are normal.





ENT: nares patent, oropharynx clear without exudates.  Moist mucous membranes.





NECK: Normal range of motion, supple without lymphadenopathy





LUNGS: Breath sounds clear to auscultation bilaterally and equal.  No wheezes 

rales or rhonchi.





HEART: Irregular irregular rate and rhythm without murmurs





ABDOMEN: Soft, nontender, normoactive bowel sounds.  No guarding, no rebound.  

No masses appreciated.





EXTREMITIES: Normal range of motion, no pitting or edema.  No cyanosis.





NEUROLOGICAL: No focal neurological deficits. Moves all extremities 

spontaneously and on command.





PSYCH: Normal mood, normal affect.





SKIN: Warm, Dry, normal turgor, no rashes or lesions noted.





Course





- Re-evaluation


Re-evalutation: 





10/13/18 22:16


Patient presents with complaints of urinary retention, history of the same.  He 

was sent from the nursing facility as they could not place a Quinonez catheter.  

Quinonez catheter is in place here without any complication.  Good urine flow.  No 

evidence of infection.  Patient denies any additional complaints or concerns.  

At this time will discharge with return precautions and follow-up 

recommendations.  Verbal discharge instructions given a the bedside and 

opportunity for questions given. Medication warnings reviewed. Patient is in 

agreement with this plan and has verbalized understanding of return precautions.





- Vital Signs


Vital signs: 


 











Temp Pulse Resp BP Pulse Ox


 


 99.1 F   96   28 H  112/72   99 


 


 10/13/18 23:18  10/13/18 23:18  10/13/18 23:18  10/13/18 23:18  10/13/18 23:18














- Laboratory


Laboratory results interpreted by me: 


 











  10/13/18





  20:56


 


Urine Blood  MODERATE H














Discharge





- Discharge


Clinical Impression: 


 Urinary retention





Condition: Fair


Disposition: SNF-Other


Additional Instructions: 


Please return to the emergency room immediately if you experience any 

concerning symptoms including high fevers, severe headache, chest pain, 

difficulty breathing, abdominal pain, slurred speech, numbness or weakness in 

your arms or legs, or any other symptom that concerns you.


Referrals: 


MIKE MCCULLOUGH MD [Primary Care Provider] - Follow up as needed

## 2019-12-17 NOTE — EKG REPORT
SEVERITY:- ABNORMAL ECG -

SINUS TACHYCARDIA

ANTERIOR INFARCT, AGE INDETERMINATE

:

Confirmed by: Franco Lazar 17-Dec-2019 22:31:03

## 2019-12-17 NOTE — EKG REPORT
SEVERITY:- ABNORMAL ECG -

SINUS TACHYCARDIA

ANTERIOR INFARCT, AGE INDETERMINATE

:

Confirmed by: Franco Lazar 17-Dec-2019 22:31:38

## 2019-12-17 NOTE — ER DOCUMENT REPORT
ED Fever





- General


Stated Complaint: FEVER


Time Seen by Provider: 12/17/19 15:13


Notes: 





Mr. Dumont is a 76 yo m w/ PMH peripheral vascular disease, muscular 

contractures, difficulty walking, insomnia, GERD, BPH, major depression, 

hypertension, malnutrition, atherosclerosis, pulmonary hypertension, chronic A. 

fib, diastolic congestive heart failure, COPD, pyothorax, chylous effusion, 

diverticulosis, GI hemorrhage, edema and weight loss BIBA for fever.  Patient is

unable to provide any history secondary to altered mental status as well as his 

current condition.  Per EMS, they were called to [facility] for fever.  [facility] 

staff told him that his axillary temp was 105.  In route with EMS, rectal temp 

was performed which was 104.1.  Patient had already been administered 650 of 

Tylenol by [facility] prior to arrival.  In route with EMS, the patient became more

confused and became hypoxic.  He is normally on 3 L of nasal cannula oxygen at 

baseline, his oxygen dropped down to the 60s.  EMS placed the patient on 

nonrebreather.  His blood pressure also became low in route with systolics in 

the 70s.


TRAVEL OUTSIDE OF THE [place] IN LAST 30 DAYS: No





- Related Data


Allergies/Adverse Reactions: 


                                        





No Known Allergies Allergy (Verified 12/09/19 09:09)


   











Past Medical History





- Social History


Smoking Status: Former Smoker


Family History: Reviewed & Not Pertinent





- Past Medical History


Cardiac Medical History: Reports: Hx Atrial Fibrillation - paroxysmal, Hx 

Coronary Artery Disease, Hx Hypercholesterolemia, Hx Hypertension


   Denies: Hx Heart Attack


Pulmonary Medical History: Reports: Hx Asthma, Hx Bronchitis, Hx COPD, Hx 

Pneumonia - june 2012, Hx Respiratory Failure


   Denies: Hx Tuberculosis


Neurological Medical History: Denies: Hx Cerebrovascular Accident, Hx Seizures


Renal/ Medical History: Reports: Hx Benign Prostatic Hyperplasia, Hx Kidney 

Stones.  Denies: Hx Peritoneal Dialysis


GI Medical History: Denies: Hx Hepatitis, Hx Hiatal Hernia, Hx Ulcer


Musculoskeletal Medical History: Reports Hx Arthritis


Psychiatric Medical History: Reports: Hx Depression


Infectious Medical History: Denies: Hx Hepatitis


Past Surgical History: Denies: Hx Open Heart Surgery, Hx Pacemaker





- Immunizations


Hx Diphtheria, Pertussis, Tetanus Vaccination: Yes


Hx Pneumococcal Vaccination: 01/01/11





Review of Systems





- Review of Systems


-: Yes ROS unobtainable due to patient's medical condition





Physical Exam





- Vital signs


Vitals: 


                                        











Pulse Ox


 


 100 


 


 12/17/19 16:05











Interpretation: Tachycardic, Hypoxic, Tachypneic, Febrile





- General


General appearance: Alert, Lethargic


In distress: Moderate


Notes: 





Diffusely cachectic.  Poorly groomed with dry and dirty skin on his chest and 

abdomen.





- HEENT


Head: Normocephalic, Atraumatic


Eyes: Normal


Pupils: PERRL





- Respiratory


Respiratory status: No respiratory distress


Chest status: Nontender


Breath sounds: Decreased air movement, Nonproductive cough, Rales, Rhonchi


Chest palpation: Normal





- Cardiovascular


Rhythm: Tachycardia


Heart sounds: Normal auscultation


Murmur: No





- Abdominal


Inspection: Normal


Distension: No distension


Bowel sounds: Normal


Tenderness: Nontender


Organomegaly: No organomegaly





- Back


Back: Normal, Nontender





- Extremities


General upper extremity: Normal inspection, Nontender, Normal color, Normal 

temperature


General lower extremity: Normal inspection, Nontender, Normal color, Normal 

temperature, Normal weight bearing, Other - Contracted bilateral lower extre

mities, flexed at the knee and hip.  No: Ryan's sign





- Neurological


Neuro grossly intact: Yes


Cognition: Normal


Orientation: Disoriented to place - Believes he is at rehab, Disoriented to time

- States it's novemver, Disoriented to events - believes Duarte is president


[place] Coma Scale Eye Opening: Spontaneous


[place] Coma Scale Verbal: Confused


[place] Coma Scale Motor: Obeys Commands


[place] Coma Scale Total: 14


Speech: Normal


Sensory: Normal





- Psychological


Associated symptoms: Normal affect, Normal mood





- Skin


Skin Temperature: Warm


Skin Moisture: Dry


Skin Color: Normal





Course





- Re-evaluation


Re-evalutation: 


Patient is ill-appearing but nontoxic.  Initial vitals notable for fever and 

tachycardia.  Blood pressure however here is stable.  Differential diagnosis 

includes sepsis, pneumonia, dehydration, UTI, electrolyte abnormality





12/17/19 15:34


Given patient's significantly elevated fever and abnormal vitals, sepsis 

protocol was initiated.  Patient ordered for 1 L of fluid for now and on 

spectrum antibiotics with bank Zosyn.  Labs including blood cultures and lactic 

acid also ordered.  Initial BP here is stable at 133.  Will reassess after [employer]

kerry of work-up returns.


12/17/19 15:35


CBC does not show significant leukocytosis however there is a significant left 

shift with 91% neutrophils.  H&H is low but stable at this point in time.  CMP s

hows elevated BUN to creatinine ratio, consistent with dehydration.  Patient was

ordered for 1500 mL's of fluid given his known history of CHF.  Will administer 

another 500 mL's.  Is 30 mL/kg sepsis bolus would be approximately 3102-0851.  

Initial lactate was elevated at 2.4.  EKG nonischemic and initial troponin is 

negative.





Chest x-ray consistent with emphysema without evidence of pneumonia.


12/17/19 18:06


Calling for admission w/ Dr. Vazquez.


12/17/19 18:07


Pt accepted by Dr. Vazquez to [facility].


12/17/19 18:55


ABG notable for elevated PCO2 of 96.  Patient was placed on BiPAP.  pH is low at

7.28.


12/17/19 19:05


 is made aware of the patient as the patient is admitted to the [facility] 

and there are no beds therefore he might require reassessment.  Antibiotics were

ordered at 1530.  I went to reassess the patient prior to leaving my shift and I

found the Zosyn hanging next to the patient's bedside.  I also continue to 

encourage nursing to obtain a UA and a bladder scanner had been sitting in the 

room for over 2 hours.  Patient was able to use the urinal however the urine was

not sent.  I discussed this with the charge nurse.  Antibiotics will be held now

however the delay in antibiotic administration was related to delay nursing 

care.











- Vital Signs


Vital signs: 


                                        











Temp Pulse Resp BP Pulse Ox


 


 102.0 F H  127 H  42 H  115/60   100 


 


 12/17/19 16:07  12/17/19 16:07  12/17/19 16:07  12/17/19 16:07  12/17/19 16:07














- Laboratory


Result Diagrams: 


                                 12/17/19 15:26





                                 12/17/19 15:26


Laboratory results interpreted by me: 


                                        











  12/17/19 12/17/19 12/17/19





  15:26 15:26 15:26


 


RBC  3.20 L  


 


Hgb  9.0 L  


 


Hct  27.9 L  


 


RDW  15.3 H  


 


Lymph % (Auto)  5.1 L  


 


Absolute Lymphs (auto)  0.4 L  


 


Seg Neutrophils %  91.1 H  


 


Carbonic Acid   


 


ABG pH   


 


ABG pCO2   


 


ABG pO2   


 


ABG HCO3   


 


ABG Total CO2   


 


ABG O2 Saturation   


 


Chloride   90 L 


 


Carbon Dioxide   42 H* 


 


BUN   29 H 


 


Lactic Acid (Sepsis)    2.4 H














  12/17/19





  15:26


 


RBC 


 


Hgb 


 


Hct 


 


RDW 


 


Lymph % (Auto) 


 


Absolute Lymphs (auto) 


 


Seg Neutrophils % 


 


Carbonic Acid  2.90 H


 


ABG pH  7.28 L


 


ABG pCO2  96.3 H*


 


ABG pO2  227.7 H


 


ABG HCO3  44.2 H


 


ABG Total CO2  47.2 H


 


ABG O2 Saturation  99.3 H


 


Chloride 


 


Carbon Dioxide 


 


BUN 


 


Lactic Acid (Sepsis) 














- EKG Interpretation by Me


EKG shows normal: Sinus rhythm, QRS Complexes, ST-T Waves


Rate: Tachycardia


Rhythm: NSR


Axis/QRS: Left axis deviation


Voltage: Decreased voltage





Critical Care Note





- Critical Care Note


Total time excluding time spent on procedures (mins): 45 - Need for multiple 

reassessments, concern for multiple etiologies including sepsis, COPD, need for 

administration of multiple medications including IV antibiotics, treatment of 

COPD exacerbation, prophylactic treatment of sepsis with broad-spectrum 

antibiotics, need for CPAP given level of work apnea





Discharge





- Discharge


Clinical Impression: 


 COPD with acute exacerbation, Lactic acidosis





Sepsis


Qualifiers:


 Sepsis type: sepsis due to unspecified organism Sepsis acute organ dysfunction 

status: unspecified Qualified Code(s): A41.9 - Sepsis, unspecified organism





Altered mental status


Qualifiers:


 Altered mental status type: unspecified Qualified Code(s): R41.82 - Altered me

ntal status, unspecified





Respiratory failure with hypercapnia


Qualifiers:


 Chronicity: acute Qualified Code(s): J96.02 - Acute respiratory failure with 

hypercapnia





Anemia


Qualifiers:


 Anemia type: unspecified type Qualified Code(s): D64.9 - Anemia, unspecified





Condition: Fair


Disposition: ADMITTED AS INPATIENT


Admitting Provider: George


Unit Admitted: [facility]

## 2019-12-17 NOTE — PDOC H&P
History of Present Illness


Admission Date/PCP: 


  12/17/19 18:20





  MIKE MCCULLOUGH MD





History of Present Illness: 


PEDRO REYES is a 77 year old male, He has a history of very severe chronic 

obstructive pulmonary disease, malnutrition, BPH, chronic diastolic heart 

failure he was transferred from the nursing home to the emergency room for 

evaluation of fever with a core temperature of 104.  Patient is resident of the 

nursing home at [place].  In the emergency room he was found to have low blood 

pressure, he was treated with bolus of normal saline at 30 cc/kg body weight, 

the chest x-ray was consistent with very severe COPD with flattened diaphragms 

there was no focal infiltrate to suggest pneumonia, the urinalysis was grossly 

abnormal suggesting he has UTI, the serum lactic acid level was elevated, he had

altered mental status, the arterial blood gas on FiO2 100%, pH 7.28, PCO2 96.3, 

PO2 227, bicarbonate 44.2, patient respiratory was supported with a noninvasive 

positive pressure ventilation the presentation is consistent with sepsis 

syndrome probably from UTI.  He has evidence of endorgan dysfunction including 

encephalopathy, vascular collapse.  I saw him in the emergency room history 

taking was a challenge, patient looks very unkept examination of the feet 

demonstrated severe onychomycosis of the nail of his feet he has 

hyperpigmentation of his feet bilaterally, there is suggestion of severe fungal 

infection of both feet.  I felt the pedal pulses in both feet the feet is warm 

suggesting that there is no vascular insufficiency, he has a history of PAD, I 

will request arterial Doppler to further evaluate arterial blood flow








Past Medical History


Cardiac Medical History: Reports: Atrial Fibrillation - paroxysmal, Coronary 

Artery Disease, Hyperlipidema, Hypertension


Pulmonary Medical History: Reports: Asthma, Bronchitis, Chronic Obstructive 

Pulmonary Disease (COPD), Pneumonia - june 2012, Respiratory Failure


Musculoskeltal Medical History: Reports: Arthritis


Psychiatric Medical History: Reports: Depression


Hematology: Reports: Anemia - chronic





Social History


Smoking Status: Former Smoker


Electronic Cigarette use?: No


Frequency of Alcohol Use: None


Hx Recreational Drug Use: No


Drugs: None


Hx Prescription Drug Abuse: No





Family History


Family History: Reviewed & Not Pertinent


Parental Family History Reviewed: Yes


Children Family History Reviewed: Yes


Sibling(s) Family History Reviewed.: Yes





Medication/Allergy


Home Medications: 








Finasteride [Proscar 5 mg Tablet] 5 mg PO DAILY 07/14/17 


Montelukast Sodium [Singulair 10 mg Tablet] 10 mg PO QPM 07/14/17 


Tamsulosin HCl [Flomax 0.4 mg Cap.sr] 0.8 mg PO QHS 07/14/17 


Furosemide [Lasix 40 mg Tablet] 40 mg PO QAM #30 tablet 11/16/17 


Apixaban [Eliquis] 5 mg PO DAILY 12/10/19 


Diltiazem HCl [Dilacor Xr] 180 mg PO DAILY 12/10/19 


Duloxetine HCl [Cymbalta 30 mg Capsule.dr] 30 mg PO DAILY 12/10/19 


Fluticasone/Salmeterol [Advair 250-50 Diskus 14 Dose/Diskus] 1 inh IH Q12 

12/10/19 


Melatonin [Melatonin 3 mg Tablet] 3 mg PO QHS 12/10/19 


Mirtazapine [Remeron 15 mg Tablet] 15 mg PO QHS 12/10/19 


Omeprazole Magnesium [Prilosec Otc] 20 mg PO DAILY 12/10/19 


Potassium Chloride [Klor-Con 10 Meq Tablet ER] 10 meq PO DAILY 12/10/19 


Umeclidinium Bromide [Incruse Ellipta] 62.5 mcg IH DAILY 12/10/19 


Acetaminophen [Tylenol 325 mg Tablet] 650 mg PO Q4HP PRN 12/17/19 


Acetaminophen [Tylenol 325 mg Tablet] 650 mg PO QHS 12/17/19 


Albuterol Sulfate [Proair Hfa Inhalation Aerosol 8.5 gm Mdi] 2 puff IH Q4HP PRN 

12/17/19 


Difluprednate [Durezol] 5 ml OP QID 12/17/19 


Ketorolac Tromethamine [Acular] 1 drop OD QID 12/17/19 


Moxifloxacin HCl [Vigamox 0.5% Oph Soln 3 ml] 1 drop OP QID 12/17/19 


Multivit-Min/FA/Lycopen/Lutein [Certavite Sr-Antioxidant Tab] 1 each PO DAILY 

12/17/19 


Roflumilast [Daliresp 500 mcg Tablet] 500 mcg PO DAILY 12/17/19 








Allergies/Adverse Reactions: 


                                        





No Known Allergies Allergy (Verified 12/09/19 09:09)


   











Review of Systems


ROS unobtainable: Due to mental status





Physical Exam


Vital Signs: 


                                        











Temp Pulse Resp BP Pulse Ox


 


 102.0 F H  127 H  42 H  115/60   100 


 


 12/17/19 16:07  12/17/19 16:07  12/17/19 16:07  12/17/19 16:07  12/17/19 16:07








                                 Intake & Output











 12/16/19 12/17/19 12/18/19





 06:59 06:59 06:59


 


Intake Total   1500


 


Balance   1500


 


Weight   54.1 kg











General appearance: PRESENT: mild distress


Eye exam: PRESENT: PERRLA


Neck exam: PRESENT: full ROM


Respiratory exam: PRESENT: decreased breath sounds


Cardiovascular exam: PRESENT: RRR, +S1, +S2


Vascular exam: PRESENT: normal capillary refill


GI/Abdominal exam: PRESENT: normal bowel sounds, soft


Rectal exam: PRESENT: deferred


Neurological exam: PRESENT: alert, CN II-XII grossly intact


Psychiatric exam: PRESENT: appropriate affect, normal mood


Skin exam: PRESENT: dry, intact, warm.  ABSENT: cyanosis, rash





Results


Laboratory Results: 


                                        





                                 12/17/19 15:26 





                                 12/17/19 15:26 





                                        











  12/17/19 12/17/19 12/17/19





  15:26 15:26 15:26


 


WBC  7.9  


 


RBC  3.20 L  


 


Hgb  9.0 L  


 


Hct  27.9 L  


 


MCV  87  


 


MCH  28.2  


 


MCHC  32.4  


 


RDW  15.3 H  


 


Plt Count  164  


 


Seg Neutrophils %  91.1 H  


 


Carbonic Acid    2.90 H


 


HCO3/H2CO3 Ratio    15:1


 


ABG pH    7.28 L


 


ABG pCO2    96.3 H*


 


ABG pO2    227.7 H


 


ABG HCO3    44.2 H


 


ABG O2 Saturation    99.3 H


 


ABG Base Excess    14.7


 


FiO2    100


 


Sodium   140.8 


 


Potassium   4.2 


 


Chloride   90 L 


 


Carbon Dioxide   42 H* 


 


Anion Gap   9 


 


BUN   29 H 


 


Creatinine   1.06 


 


Est GFR ( Amer)   > 60 


 


Glucose   90 


 


Calcium   9.8 


 


Total Bilirubin   0.5 


 


AST   22 


 


Alkaline Phosphatase   79 


 


Total Protein   6.8 


 


Albumin   3.6 


 


Urine Color   


 


Urine Appearance   


 


Urine pH   


 


Ur Specific Gravity   


 


Urine Protein   


 


Urine Glucose (UA)   


 


Urine Ketones   


 


Urine Blood   


 


Urine RBC (Auto)   














  12/17/19





  19:07


 


WBC 


 


RBC 


 


Hgb 


 


Hct 


 


MCV 


 


MCH 


 


MCHC 


 


RDW 


 


Plt Count 


 


Seg Neutrophils % 


 


Carbonic Acid 


 


HCO3/H2CO3 Ratio 


 


ABG pH 


 


ABG pCO2 


 


ABG pO2 


 


ABG HCO3 


 


ABG O2 Saturation 


 


ABG Base Excess 


 


FiO2 


 


Sodium 


 


Potassium 


 


Chloride 


 


Carbon Dioxide 


 


Anion Gap 


 


BUN 


 


Creatinine 


 


Est GFR (African Amer) 


 


Glucose 


 


Calcium 


 


Total Bilirubin 


 


AST 


 


Alkaline Phosphatase 


 


Total Protein 


 


Albumin 


 


Urine Color  YELLOW


 


Urine Appearance  SLIGHTLY-CLOUDY


 


Urine pH  7.0


 


Ur Specific Gravity  1.012


 


Urine Protein  100 H


 


Urine Glucose (UA)  NEGATIVE


 


Urine Ketones  NEGATIVE


 


Urine Blood  LARGE H


 


Urine RBC (Auto)  24








                                        











  12/17/19





  15:26


 


Troponin I  < 0.012











Impressions: 


                                        





Chest X-Ray  12/17/19 15:17


IMPRESSION:  COPD.  No acute findings.


 














Assessment & Plan





- Diagnosis


(1) Sepsis


Qualifiers: 


   Sepsis type: sepsis due to unspecified organism   Sepsis acute organ 

dysfunction status: with acute organ dysfunction   Severe sepsis acute organ 

dysfunction type: encephalopathy   Severe sepsis shock status: with septic shock

  Qualified Code(s): A41.9 - Sepsis, unspecified organism; R65.21 - Severe 

sepsis with septic shock; G93.40 - Encephalopathy, unspecified   


Is this a current diagnosis for this admission?: Yes   


Plan: 


Patient with sepsis syndrome, continue maintenance fluids, start IV antibiotic 

Zosyn, the source of the sepsis is probably the urine, the urinalysis is grossly

abnormal








(2) Acute respiratory failure with hypercapnia


Is this a current diagnosis for this admission?: Yes   


Plan: 


He has acute respiratory acidosis with hypercapnia, he requires noninvasive 

positive pressure  ventilation








(3) Chronic obstructive pulmonary disease


Is this a current diagnosis for this admission?: Yes   





(4) Undernutrition syndrome


Is this a current diagnosis for this admission?: Yes   





(5) Urinary tract infection


Qualifiers: 


   Urinary tract infection type: site unspecified   Hematuria presence: without 

hematuria   Qualified Code(s): N39.0 - Urinary tract infection, site not 

specified   


Is this a current diagnosis for this admission?: Yes   


Plan: 


This is probably the source of the UTI, start IV antibiotic

## 2019-12-18 NOTE — EKG REPORT
SEVERITY:- ABNORMAL ECG -

SINUS OR ECTOPIC ATRIAL TACHYCARDIA

ANTERIOR INFARCT, AGE INDETERMINATE

:

Confirmed by: Franco Lazar 18-Dec-2019 21:39:23

## 2019-12-18 NOTE — PDOC PROGRESS REPORT
Subjective


Progress Note for:: 12/18/19


Subjective:: 





Patient was seen by the bedside, he was admitted yesterday when he presented 

with sepsis syndrome, the blood culture is positive for E. coli


Reason For Visit: 


SEPSIS, ACUTE RESPIRATORY FAILURE, UTI








Physical Exam


Vital Signs: 


                                        











Temp Pulse Resp BP Pulse Ox


 


 97.8 F   89   17   99/55 L  100 


 


 12/18/19 15:59  12/18/19 15:59  12/18/19 15:59  12/18/19 15:59  12/18/19 15:59








                                 Intake & Output











 12/17/19 12/18/19 12/19/19





 06:59 06:59 06:59


 


Intake Total  1600 1100


 


Balance  1600 1100


 


Weight  54.1 kg 











General appearance: PRESENT: no acute distress


Eye exam: PRESENT: PERRLA


Respiratory exam: PRESENT: clear to auscultation rivera


Cardiovascular exam: PRESENT: +S1, +S2


GI/Abdominal exam: PRESENT: soft


Neurological exam: PRESENT: alert





Results


Laboratory Results: 


                                        





                                 12/18/19 03:58 





                                 12/18/19 03:58 





                                        











  12/17/19 12/17/19 12/17/19





  15:26 15:26 19:07


 


WBC   


 


RBC   


 


Hgb   


 


Hct   


 


MCV   


 


MCH   


 


MCHC   


 


RDW   


 


Plt Count   


 


Seg Neutrophils %   


 


Carbonic Acid   2.90 H 


 


HCO3/H2CO3 Ratio   15:1 


 


ABG pH   7.28 L 


 


ABG pCO2   96.3 H* 


 


ABG pO2   227.7 H 


 


ABG HCO3   44.2 H 


 


ABG O2 Saturation   99.3 H 


 


ABG Base Excess   14.7 


 


FiO2   100 


 


Sodium  140.8  


 


Potassium  4.2  


 


Chloride  90 L  


 


Carbon Dioxide  42 H*  


 


Anion Gap  9  


 


BUN  29 H  


 


Creatinine  1.06  


 


Est GFR ( Amer)  > 60  


 


Glucose  90  


 


Calcium  9.8  


 


Phosphorus   


 


Magnesium   


 


Total Bilirubin  0.5  


 


AST  22  


 


Alkaline Phosphatase  79  


 


Ammonia   


 


Total Protein  6.8  


 


Albumin  3.6  


 


Triglycerides   


 


Cholesterol   


 


LDL Cholesterol Direct   


 


VLDL Cholesterol   


 


HDL Cholesterol   


 


Amylase   


 


Lipase   


 


TSH   


 


Free T4   


 


Urine Color    YELLOW


 


Urine Appearance    SLIGHTLY-CLOUDY


 


Urine pH    7.0


 


Ur Specific Gravity    1.012


 


Urine Protein    100 H


 


Urine Glucose (UA)    NEGATIVE


 


Urine Ketones    NEGATIVE


 


Urine Blood    LARGE H


 


Urine RBC (Auto)    24














  12/17/19 12/17/19 12/17/19





  21:52 21:52 21:52


 


WBC   


 


RBC   


 


Hgb   


 


Hct   


 


MCV   


 


MCH   


 


MCHC   


 


RDW   


 


Plt Count   


 


Seg Neutrophils %   


 


Carbonic Acid   


 


HCO3/H2CO3 Ratio   


 


ABG pH   


 


ABG pCO2   


 


ABG pO2   


 


ABG HCO3   


 


ABG O2 Saturation   


 


ABG Base Excess   


 


FiO2   


 


Sodium   


 


Potassium   


 


Chloride   


 


Carbon Dioxide   


 


Anion Gap   


 


BUN   


 


Creatinine   


 


Est GFR (African Amer)   


 


Glucose   


 


Calcium   


 


Phosphorus  2.6  


 


Magnesium  2.0  


 


Total Bilirubin   


 


AST   


 


Alkaline Phosphatase   


 


Ammonia   < 8.7 L 


 


Total Protein   


 


Albumin   


 


Triglycerides   


 


Cholesterol   


 


LDL Cholesterol Direct   


 


VLDL Cholesterol   


 


HDL Cholesterol   


 


Amylase  144 H  


 


Lipase  75.3  


 


TSH    0.83


 


Free T4    1.40


 


Urine Color   


 


Urine Appearance   


 


Urine pH   


 


Ur Specific Gravity   


 


Urine Protein   


 


Urine Glucose (UA)   


 


Urine Ketones   


 


Urine Blood   


 


Urine RBC (Auto)   














  12/18/19 12/18/19





  03:58 03:58


 


WBC  16.7 H D 


 


RBC  3.14 L 


 


Hgb  8.7 L 


 


Hct  27.5 L 


 


MCV  87 


 


MCH  27.8 


 


MCHC  31.8 L 


 


RDW  15.9 H 


 


Plt Count  153 


 


Seg Neutrophils %  Not Reportable 


 


Carbonic Acid  


 


HCO3/H2CO3 Ratio  


 


ABG pH  


 


ABG pCO2  


 


ABG pO2  


 


ABG HCO3  


 


ABG O2 Saturation  


 


ABG Base Excess  


 


FiO2  


 


Sodium   142.0


 


Potassium   4.7


 


Chloride   94 L


 


Carbon Dioxide   39 H


 


Anion Gap   9


 


BUN   30 H


 


Creatinine   0.96


 


Est GFR (African Amer)   > 60


 


Glucose   119 H


 


Calcium   10.0


 


Phosphorus  


 


Magnesium  


 


Total Bilirubin   0.7


 


AST   39


 


Alkaline Phosphatase   72


 


Ammonia  


 


Total Protein   6.6


 


Albumin   3.6


 


Triglycerides   76


 


Cholesterol   209.93 H


 


LDL Cholesterol Direct   65


 


VLDL Cholesterol   15.0


 


HDL Cholesterol   104


 


Amylase  


 


Lipase  


 


TSH  


 


Free T4  


 


Urine Color  


 


Urine Appearance  


 


Urine pH  


 


Ur Specific Gravity  


 


Urine Protein  


 


Urine Glucose (UA)  


 


Urine Ketones  


 


Urine Blood  


 


Urine RBC (Auto)  








                                        





12/17/19 16:47   Blood   Blood Culture (PCR) - Final


                            Escherichia Coli





                                        











  12/17/19 12/17/19 12/17/19





  15:26 21:52 21:52


 


Creatine Kinase    66


 


CK-MB (CK-2)   


 


Troponin I  < 0.012  


 


NT-Pro-B Natriuret Pep   382 














  12/17/19 12/18/19 12/18/19





  21:52 03:58 03:58


 


Creatine Kinase   77 


 


CK-MB (CK-2)  0.83   0.91


 


Troponin I  0.032   0.021


 


NT-Pro-B Natriuret Pep   














  12/18/19 12/18/19





  11:24 11:24


 


Creatine Kinase  68 


 


CK-MB (CK-2)   0.59


 


Troponin I   0.016


 


NT-Pro-B Natriuret Pep  











Impressions: 


                                        





Chest X-Ray  12/17/19 15:17


IMPRESSION:  COPD.  No acute findings.


 














Assessment & Plan





- Diagnosis


(1) Sepsis


Qualifiers: 


   Sepsis type: Escherichia coli   Sepsis acute organ dysfunction status: with 

acute organ dysfunction   Severe sepsis acute organ dysfunction type: 

encephalopathy   Severe sepsis shock status: with septic shock   Qualified 

Code(s): A41.51 - Sepsis due to Escherichia coli [E. coli]; R65.21 - Severe 

sepsis with septic shock; G93.40 - Encephalopathy, unspecified   


Is this a current diagnosis for this admission?: Yes   


Plan: 


Continue present IV antibiotic








(2) Acute respiratory failure with hypercapnia


Is this a current diagnosis for this admission?: Yes   





(3) Chronic obstructive pulmonary disease


Is this a current diagnosis for this admission?: Yes   





(4) Undernutrition syndrome


Is this a current diagnosis for this admission?: Yes   





(5) Urinary tract infection


Qualifiers: 


   Urinary tract infection type: site unspecified   Hematuria presence: without 

hematuria   Qualified Code(s): N39.0 - Urinary tract infection, site not 

specified   


Is this a current diagnosis for this admission?: Yes   


Plan: 


The urine culture is growing gram-negative rick, specific pathogen pending








- Time


Time Spent with patient: 35 or more minutes


Level of Care: IMCU

## 2019-12-19 NOTE — PDOC PROGRESS REPORT
Subjective


Progress Note for:: 12/19/19


Subjective:: 





Patient seen by the bedside, he has underlining atrial fibrillation presently 

there is rapid ventricular rate, he is also tachypneic, requiring noninvasive 

positive pressure ventilation


Reason For Visit: 


SEPSIS, ACUTE RESPIRATORY FAILURE, UTI








Physical Exam


Vital Signs: 


                                        











Temp Pulse Resp BP Pulse Ox


 


 98.5 F   116 H  18   125/70   96 


 


 12/19/19 14:54  12/19/19 19:00  12/19/19 14:54  12/19/19 14:54  12/19/19 14:54








                                 Intake & Output











 12/18/19 12/19/19 12/20/19





 06:59 06:59 06:59


 


Intake Total 1600 2800 1697


 


Output Total  0 


 


Balance 1600 2800 1697


 


Weight 54.1 kg 55.9 kg 











General appearance: PRESENT: mild distress


Eye exam: PRESENT: PERRLA


Respiratory exam: PRESENT: decreased breath sounds


Cardiovascular exam: PRESENT: +S1, +S2, tachycardia


GI/Abdominal exam: PRESENT: soft


Neurological exam: PRESENT: alert, CN II-XII grossly intact





Results


Laboratory Results: 


                                        





                                 12/19/19 06:28 





                                 12/18/19 03:58 





                                        











  12/19/19





  06:28


 


WBC  14.3 H


 


RBC  3.28 L


 


Hgb  9.1 L


 


Hct  28.9 L


 


MCV  88


 


MCH  27.7


 


MCHC  31.4 L


 


RDW  15.8 H


 


Plt Count  138 L


 


Seg Neutrophils %  Not Reportable








                                        





12/17/19 16:47   Blood   Blood Culture (PCR) - Final


                            Escherichia Coli





                                        











  12/17/19 12/17/19 12/17/19





  15:26 21:52 21:52


 


Creatine Kinase    66


 


CK-MB (CK-2)   


 


Troponin I  < 0.012  


 


NT-Pro-B Natriuret Pep   382 














  12/17/19 12/18/19 12/18/19





  21:52 03:58 03:58


 


Creatine Kinase   77 


 


CK-MB (CK-2)  0.83   0.91


 


Troponin I  0.032   0.021


 


NT-Pro-B Natriuret Pep   














  12/18/19 12/18/19





  11:24 11:24


 


Creatine Kinase  68 


 


CK-MB (CK-2)   0.59


 


Troponin I   0.016


 


NT-Pro-B Natriuret Pep  











Impressions: 


                                        





Chest X-Ray  12/17/19 15:17


IMPRESSION:  COPD.  No acute findings.


 














Assessment & Plan





- Diagnosis


(1) Sepsis


Qualifiers: 


   Sepsis type: Escherichia coli   Sepsis acute organ dysfunction status: with 

acute organ dysfunction   Severe sepsis acute organ dysfunction type: 

encephalopathy   Severe sepsis shock status: with septic shock   Qualified 

Code(s): A41.51 - Sepsis due to Escherichia coli [E. coli]; R65.21 - Severe 

sepsis with septic shock; G93.40 - Encephalopathy, unspecified   


Is this a current diagnosis for this admission?: Yes   


Plan: 


She has E. coli septicemia, the sensitivity of the bacteria is not back 

presently empirically on intravenous Zosyn








(2) Acute respiratory failure with hypercapnia


Is this a current diagnosis for this admission?: Yes   


Plan: 


Patient presently requiring noninvasive positive pressure ventilation, BiPAP








(3) Chronic obstructive pulmonary disease


Is this a current diagnosis for this admission?: Yes   





(4) Undernutrition syndrome


Is this a current diagnosis for this admission?: Yes   





(5) Urinary tract infection


Qualifiers: 


   Urinary tract infection type: site unspecified   Hematuria presence: without 

hematuria   Qualified Code(s): N39.0 - Urinary tract infection, site not 

specified   


Is this a current diagnosis for this admission?: Yes   





(6) Atrial fibrillation with RVR


Is this a current diagnosis for this admission?: Yes   


Plan: 


He has atrial fibrillation with rapid regular response, start Cardizem infusion








- Time


Time Spent with patient: 25-34 minutes

## 2019-12-20 NOTE — PDOC PROGRESS REPORT
Subjective


Progress Note for:: 12/20/19


Subjective:: 





Patient seen by the bedside, the blood culture grew ESBL E. coli as well as 

urine culture


Reason For Visit: 


SEPSIS, ACUTE RESPIRATORY FAILURE, UTI








Physical Exam


Vital Signs: 


                                        











Temp Pulse Resp BP Pulse Ox


 


 97.7 F   90   18   127/59 H  100 


 


 12/20/19 17:18  12/20/19 19:00  12/20/19 17:18  12/20/19 17:18  12/20/19 17:18








                                 Intake & Output











 12/19/19 12/20/19 12/21/19





 06:59 06:59 06:59


 


Intake Total 2800 2087 350


 


Output Total 0 500 400


 


Balance 2800 1587 -50


 


Weight 55.9 kg 58.5 kg 58.5 kg











General appearance: PRESENT: mild distress


Eye exam: PRESENT: PERRLA


Respiratory exam: PRESENT: clear to auscultation rivera


Cardiovascular exam: PRESENT: +S1, +S2


GI/Abdominal exam: PRESENT: soft


Neurological exam: PRESENT: alert





Results


Laboratory Results: 


                                        





                                 12/20/19 04:55 





                                 12/18/19 03:58 





                                        











  12/20/19





  04:55


 


WBC  8.5


 


RBC  2.96 L


 


Hgb  8.4 L


 


Hct  26.1 L


 


MCV  88


 


MCH  28.3


 


MCHC  32.2


 


RDW  15.7 H


 


Plt Count  113 L


 


Seg Neutrophils %  87.9 H








                                        





12/17/19 19:07   Clean Catch Midstream   Urine Culture - Final


                            Escherichia Coli Esbl


12/17/19 16:47   Blood   Blood Culture (PCR) - Final


                            Escherichia Coli


12/17/19 16:47   Blood   Blood Culture - Final


                            Escherichia Coli Esbl


12/17/19 15:26   Blood   Blood Culture - Final


                            Escherichia Coli Esbl





                                        











  12/17/19 12/17/19 12/17/19





  15:26 21:52 21:52


 


Creatine Kinase    66


 


CK-MB (CK-2)   


 


Troponin I  < 0.012  


 


NT-Pro-B Natriuret Pep   382 














  12/17/19 12/18/19 12/18/19





  21:52 03:58 03:58


 


Creatine Kinase   77 


 


CK-MB (CK-2)  0.83   0.91


 


Troponin I  0.032   0.021


 


NT-Pro-B Natriuret Pep   














  12/18/19 12/18/19





  11:24 11:24


 


Creatine Kinase  68 


 


CK-MB (CK-2)   0.59


 


Troponin I   0.016


 


NT-Pro-B Natriuret Pep  











Impressions: 


                                        





Chest X-Ray  12/17/19 15:17


IMPRESSION:  COPD.  No acute findings.


 














Assessment & Plan





- Diagnosis


(1) Sepsis


Qualifiers: 


   Sepsis type: Escherichia coli   Sepsis acute organ dysfunction status: with 

acute organ dysfunction   Severe sepsis acute organ dysfunction type: 

encephalopathy   Severe sepsis shock status: with septic shock   Qualified 

Code(s): A41.51 - Sepsis due to Escherichia coli [E. coli]; R65.21 - Severe 

sepsis with septic shock; G93.40 - Encephalopathy, unspecified   


Is this a current diagnosis for this admission?: Yes   


Plan: 


He has ESBL E. coli, DC IV Zosyn, start IV ertapenem








(2) Acute respiratory failure with hypercapnia


Is this a current diagnosis for this admission?: Yes   





(3) Chronic obstructive pulmonary disease


Is this a current diagnosis for this admission?: Yes   





(4) Undernutrition syndrome


Is this a current diagnosis for this admission?: Yes   





(5) Urinary tract infection


Qualifiers: 


   Urinary tract infection type: site unspecified   Hematuria presence: without 

hematuria   Qualified Code(s): N39.0 - Urinary tract infection, site not 

specified   


Is this a current diagnosis for this admission?: Yes   





(6) Atrial fibrillation with RVR


Is this a current diagnosis for this admission?: Yes   





- Time


Time Spent with patient: 35 or more minutes

## 2019-12-21 NOTE — PDOC PROGRESS REPORT
Subjective


Progress Note for:: 12/21/19


Subjective:: 





Patient was admitted for the management of ESBL E. coli septicemia, stenosis 

stated that he has very poor intake, patient is not communicating effectively, 

he has lost so much weight with no muscle mass, the lower extremity is somewhat 

contracted partly due to nonuse atrophy.


Reason For Visit: 


SEPSIS, ACUTE RESPIRATORY FAILURE, UTI








Physical Exam


Vital Signs: 


                                        











Temp Pulse Resp BP Pulse Ox


 


 97.5 F   99   19   150/72 H  99 


 


 12/21/19 14:33  12/21/19 14:33  12/21/19 16:26  12/21/19 14:33  12/21/19 14:33








                                 Intake & Output











 12/20/19 12/21/19 12/22/19





 06:59 06:59 06:59


 


Intake Total 3087 650 285


 


Output Total 500 400 


 


Balance 2587 250 285


 


Weight 58.5 kg 59.1 kg 











General appearance: PRESENT: no acute distress


Eye exam: PRESENT: PERRLA


Respiratory exam: PRESENT: clear to auscultation rivera


Cardiovascular exam: PRESENT: irregular rhythm, +S1, +S2


GI/Abdominal exam: PRESENT: soft


Extremities exam: PRESENT: other - Contracted extremities


Neurological exam: PRESENT: alert





Results


Laboratory Results: 


                                        





                                 12/20/19 04:55 





                                 12/18/19 03:58 





                                        











  12/17/19 12/17/19 12/17/19





  15:26 21:52 21:52


 


Creatine Kinase    66


 


CK-MB (CK-2)   


 


Troponin I  < 0.012  


 


NT-Pro-B Natriuret Pep   382 














  12/17/19 12/18/19 12/18/19





  21:52 03:58 03:58


 


Creatine Kinase   77 


 


CK-MB (CK-2)  0.83   0.91


 


Troponin I  0.032   0.021


 


NT-Pro-B Natriuret Pep   














  12/18/19 12/18/19





  11:24 11:24


 


Creatine Kinase  68 


 


CK-MB (CK-2)   0.59


 


Troponin I   0.016


 


NT-Pro-B Natriuret Pep  











Impressions: 


                                        





Chest X-Ray  12/17/19 15:17


IMPRESSION:  COPD.  No acute findings.


 














Assessment & Plan





- Diagnosis


(1) Sepsis


Qualifiers: 


   Sepsis type: Escherichia coli   Sepsis acute organ dysfunction status: with 

acute organ dysfunction   Severe sepsis acute organ dysfunction type: 

encephalopathy   Severe sepsis shock status: with septic shock   Qualified 

Code(s): A41.51 - Sepsis due to Escherichia coli [E. coli]; R65.21 - Severe 

sepsis with septic shock; G93.40 - Encephalopathy, unspecified   


Is this a current diagnosis for this admission?: Yes   


Plan: 


Continue IV antibiotic with ertapenem








(2) Acute respiratory failure with hypercapnia


Is this a current diagnosis for this admission?: Yes   


Plan: 


Patient continues to require noninvasive positive pressure ventilation, BiPAP








(3) Chronic obstructive pulmonary disease


Is this a current diagnosis for this admission?: Yes   





(4) Undernutrition syndrome


Is this a current diagnosis for this admission?: Yes   





(5) Urinary tract infection


Qualifiers: 


   Urinary tract infection type: site unspecified   Hematuria presence: without 

hematuria   Qualified Code(s): N39.0 - Urinary tract infection, site not speci

fied   


Is this a current diagnosis for this admission?: Yes   





(6) Atrial fibrillation with RVR


Is this a current diagnosis for this admission?: Yes   


Plan: 


Presently rate controlled








- Time


Time Spent with patient: 35 or more minutes


Level of Care: IMCU

## 2019-12-22 NOTE — PDOC PROGRESS REPORT
Subjective


Progress Note for:: 12/22/19


Subjective:: 





Patient seen by the bedside, condition remains poor but stable overall prognosis

is poor,The nurse said patient is agitated


Reason For Visit: 


SEPSIS, ACUTE RESPIRATORY FAILURE, UTI








Physical Exam


Vital Signs: 


                                        











Temp Pulse Resp BP Pulse Ox


 


 97.5 F   112 H  19   139/91 H  100 


 


 12/22/19 07:35  12/22/19 14:00  12/22/19 03:41  12/22/19 07:35  12/22/19 07:35








                                 Intake & Output











 12/21/19 12/22/19 12/23/19





 06:59 06:59 06:59


 


Intake Total 650 2335 120


 


Output Total 400  400


 


Balance 250 2335 -280


 


Weight 59.1 kg 58.6 kg 











General appearance: PRESENT: no acute distress


Eye exam: PRESENT: PERRLA


Respiratory exam: PRESENT: clear to auscultation rivera


Cardiovascular exam: PRESENT: +S1, +S2


GI/Abdominal exam: PRESENT: soft


Neurological exam: PRESENT: alert





Results


Laboratory Results: 


                                        





                                 12/20/19 04:55 





                                 12/18/19 03:58 





                                        











  12/17/19 12/17/19 12/17/19





  15:26 21:52 21:52


 


Creatine Kinase    66


 


CK-MB (CK-2)   


 


Troponin I  < 0.012  


 


NT-Pro-B Natriuret Pep   382 














  12/17/19 12/18/19 12/18/19





  21:52 03:58 03:58


 


Creatine Kinase   77 


 


CK-MB (CK-2)  0.83   0.91


 


Troponin I  0.032   0.021


 


NT-Pro-B Natriuret Pep   














  12/18/19 12/18/19





  11:24 11:24


 


Creatine Kinase  68 


 


CK-MB (CK-2)   0.59


 


Troponin I   0.016


 


NT-Pro-B Natriuret Pep  











Impressions: 


                                        





Chest X-Ray  12/17/19 15:17


IMPRESSION:  COPD.  No acute findings.


 














Assessment & Plan





- Diagnosis


(1) Sepsis


Qualifiers: 


   Sepsis type: Escherichia coli   Sepsis acute organ dysfunction status: with 

acute organ dysfunction   Severe sepsis acute organ dysfunction type: 

encephalopathy   Severe sepsis shock status: with septic shock   Qualified 

Code(s): A41.51 - Sepsis due to Escherichia coli [E. coli]; R65.21 - Severe 

sepsis with septic shock; G93.40 - Encephalopathy, unspecified   


Is this a current diagnosis for this admission?: Yes   


Plan: 


Continue intravenous ertapenem








(2) Acute respiratory failure with hypercapnia


Is this a current diagnosis for this admission?: Yes   





(3) Chronic obstructive pulmonary disease


Is this a current diagnosis for this admission?: Yes   





(4) Undernutrition syndrome


Is this a current diagnosis for this admission?: Yes   





(5) Urinary tract infection


Qualifiers: 


   Urinary tract infection type: site unspecified   Hematuria presence: without 

hematuria   Qualified Code(s): N39.0 - Urinary tract infection, site not 

specified   


Is this a current diagnosis for this admission?: Yes   





(6) Atrial fibrillation with RVR


Is this a current diagnosis for this admission?: Yes   





(7) Anxiety


Is this a current diagnosis for this admission?: Yes   


Plan: 


start buspirone 








- Time


Time Spent with patient: 25-34 minutes

## 2019-12-23 NOTE — PDOC PROGRESS REPORT
Subjective


Subjective:: 





Patient seen by the bedside, he continues to require noninvasive positive 

pressure ventilation, BiPAP, he was transfused with 2 units of packed red blood 

cells


Reason For Visit: 


SEPSIS, ACUTE RESPIRATORY FAILURE, UTI








Physical Exam


Vital Signs: 


                                        











Temp Pulse Resp BP Pulse Ox


 


 97.9 F   105 H  33 H  156/75 H  100 


 


 12/23/19 16:42  12/23/19 16:42  12/23/19 16:42  12/23/19 16:42  12/23/19 16:42








                                 Intake & Output











 12/22/19 12/23/19 12/24/19





 06:59 06:59 06:59


 


Intake Total 2335 2050 118


 


Output Total  400 


 


Balance 2335 1650 118


 


Weight 58.6 kg 59.2 kg 59.2 kg











General appearance: PRESENT: no acute distress


Eye exam: PRESENT: PERRLA


Respiratory exam: PRESENT: clear to auscultation rivera


Cardiovascular exam: PRESENT: +S1, +S2


GI/Abdominal exam: PRESENT: soft


Neurological exam: PRESENT: alert, CN II-XII grossly intact





Results


Laboratory Results: 


                                        





                                 12/23/19 04:19 





                                 12/23/19 04:19 





                                        











  12/22/19 12/23/19 12/23/19





  16:58 04:19 04:19


 


WBC   4.2 


 


RBC   3.70 L 


 


Hgb   10.4 L D 


 


Hct   31.9 L 


 


MCV   86 


 


MCH   28.2 


 


MCHC   32.7 


 


RDW   15.2 H 


 


Plt Count   91 L 


 


Seg Neutrophils %   79.9 H 


 


Sodium    146.6 H


 


Potassium    4.1


 


Chloride    100


 


Carbon Dioxide    42 H*


 


Anion Gap    5


 


BUN    11


 


Creatinine    0.63


 


Est GFR ( Amer)    > 60


 


Glucose    85


 


Calcium    10.9 H


 


Total Bilirubin    0.8


 


AST    18


 


Alkaline Phosphatase    56


 


Total Protein    5.8 L


 


Albumin    3.0 L


 


Blood Type  O POSITIVE  


 


Antibody Screen  NEGATIVE  








                                        











  12/17/19 12/17/19 12/17/19





  15:26 21:52 21:52


 


Creatine Kinase    66


 


CK-MB (CK-2)   


 


Troponin I  < 0.012  


 


NT-Pro-B Natriuret Pep   382 














  12/17/19 12/18/19 12/18/19





  21:52 03:58 03:58


 


Creatine Kinase   77 


 


CK-MB (CK-2)  0.83   0.91


 


Troponin I  0.032   0.021


 


NT-Pro-B Natriuret Pep   














  12/18/19 12/18/19





  11:24 11:24


 


Creatine Kinase  68 


 


CK-MB (CK-2)   0.59


 


Troponin I   0.016


 


NT-Pro-B Natriuret Pep  











Impressions: 


                                        





Chest X-Ray  12/17/19 15:17


IMPRESSION:  COPD.  No acute findings.


 














Assessment & Plan





- Diagnosis


(1) Sepsis


Qualifiers: 


   Sepsis type: Escherichia coli   Sepsis acute organ dysfunction status: with 

acute organ dysfunction   Severe sepsis acute organ dysfunction type: 

encephalopathy   Severe sepsis shock status: with septic shock   Qualified 

Code(s): A41.51 - Sepsis due to Escherichia coli [E. coli]; R65.21 - Severe 

sepsis with septic shock; G93.40 - Encephalopathy, unspecified   


Is this a current diagnosis for this admission?: Yes   


Plan: 


Continue IV antibiotic








(2) Acute respiratory failure with hypercapnia


Is this a current diagnosis for this admission?: Yes   


Plan: 


Continue BiPAP








(3) Chronic obstructive pulmonary disease


Is this a current diagnosis for this admission?: Yes   





(4) Undernutrition syndrome


Is this a current diagnosis for this admission?: Yes   





(5) Urinary tract infection


Qualifiers: 


   Urinary tract infection type: site unspecified   Hematuria presence: without 

hematuria   Qualified Code(s): N39.0 - Urinary tract infection, site not 

specified   


Is this a current diagnosis for this admission?: Yes   





(6) Atrial fibrillation with RVR


Is this a current diagnosis for this admission?: Yes   





(7) Anxiety


Is this a current diagnosis for this admission?: Yes   





(8) Anemia


Qualifiers: 


   Anemia type: unspecified type   Qualified Code(s): D64.9 - Anemia, 

unspecified   


Is this a current diagnosis for this admission?: Yes   


Plan: 


Status post blood transfusion








- Time


Time Spent with patient: 35 or more minutes


Level of Care: IMCU

## 2019-12-24 NOTE — PDOC PROGRESS REPORT
Subjective


Progress Note for:: 12/24/19


Subjective:: 





Patient seen by the bedside, his condition continues to deteriorate, he is not 

eating, is avidly requiring BiPAP to support breathing


Reason For Visit: 


SEPSIS, ACUTE RESPIRATORY FAILURE, UTI








Physical Exam


Vital Signs: 


                                        











Temp Pulse Resp BP Pulse Ox


 


 98.1 F   105 H  27 H  159/73 H  100 


 


 12/24/19 11:09  12/24/19 11:09  12/24/19 11:09  12/24/19 11:09  12/24/19 11:09








                                 Intake & Output











 12/23/19 12/24/19 12/25/19





 06:59 06:59 06:59


 


Intake Total 2050 168 100


 


Output Total 400  


 


Balance 1650 168 100


 


Weight 59.2 kg 58.8 kg 











General appearance: PRESENT: no acute distress


Eye exam: PRESENT: PERRLA


Respiratory exam: PRESENT: decreased breath sounds


Cardiovascular exam: PRESENT: +S1, +S2


GI/Abdominal exam: PRESENT: soft


Neurological exam: PRESENT: alert





Results


Laboratory Results: 


                                        





                                 12/23/19 04:19 





                                 12/23/19 04:19 





                                        











  12/17/19 12/17/19 12/17/19





  15:26 21:52 21:52


 


Creatine Kinase    66


 


CK-MB (CK-2)   


 


Troponin I  < 0.012  


 


NT-Pro-B Natriuret Pep   382 














  12/17/19 12/18/19 12/18/19





  21:52 03:58 03:58


 


Creatine Kinase   77 


 


CK-MB (CK-2)  0.83   0.91


 


Troponin I  0.032   0.021


 


NT-Pro-B Natriuret Pep   














  12/18/19 12/18/19





  11:24 11:24


 


Creatine Kinase  68 


 


CK-MB (CK-2)   0.59


 


Troponin I   0.016


 


NT-Pro-B Natriuret Pep  











Impressions: 


                                        





Chest X-Ray  12/17/19 15:17


IMPRESSION:  COPD.  No acute findings.


 














Assessment & Plan





- Diagnosis


(1) Sepsis


Qualifiers: 


   Sepsis type: Escherichia coli   Sepsis acute organ dysfunction status: with 

acute organ dysfunction   Severe sepsis acute organ dysfunction type: 

encephalopathy   Severe sepsis shock status: with septic shock   Qualified 

Code(s): A41.51 - Sepsis due to Escherichia coli [E. coli]; R65.21 - Severe 

sepsis with septic shock; G93.40 - Encephalopathy, unspecified   


Is this a current diagnosis for this admission?: Yes   


Plan: 


Continue IV antibiotic








(2) Acute respiratory failure with hypercapnia


Is this a current diagnosis for this admission?: Yes   


Plan: 


Continue NIPPV








(3) Chronic obstructive pulmonary disease


Is this a current diagnosis for this admission?: Yes   





(4) Undernutrition syndrome


Is this a current diagnosis for this admission?: Yes   





(5) Urinary tract infection


Qualifiers: 


   Urinary tract infection type: site unspecified   Hematuria presence: without 

hematuria   Qualified Code(s): N39.0 - Urinary tract infection, site not 

specified   


Is this a current diagnosis for this admission?: Yes   





(6) Atrial fibrillation with RVR


Is this a current diagnosis for this admission?: Yes   





(7) Anxiety


Is this a current diagnosis for this admission?: Yes   





(8) Anemia


Qualifiers: 


   Anemia type: unspecified type   Qualified Code(s): D64.9 - Anemia, 

unspecified   


Is this a current diagnosis for this admission?: Yes   





- Time


Time Spent with patient: 35 or more minutes

## 2019-12-24 NOTE — ADVANCED CARE
- Diagnosis


(1) Sepsis


Diagnosis Current: Yes   





(2) Acute respiratory failure with hypercapnia


Diagnosis Current: Yes   





(3) Chronic obstructive pulmonary disease


Diagnosis Current: Yes   





(4) Undernutrition syndrome


Diagnosis Current: Yes   





(5) Urinary tract infection


Diagnosis Current: Yes   





(6) Atrial fibrillation with RVR


Diagnosis Current: Yes   





(7) Anxiety


Diagnosis Current: Yes   





(8) Anemia


Diagnosis Current: Yes   


Resuscitation Status: Do Not Resuscitate


Discussion: 





I discussed CODE STATUS with patient, patient want to be DNR status

## 2019-12-25 NOTE — PDOC PROGRESS REPORT
Subjective


Progress Note for:: 12/25/19


Subjective:: 





Patient overall condition is very poor, he has been refusing food, he continues 

to require NIPPV


Reason For Visit: 


SEPSIS, ACUTE RESPIRATORY FAILURE, UTI








Physical Exam


Vital Signs: 


                                        











Temp Pulse Resp BP Pulse Ox


 


 98.0 F   97   20   140/76 H  100 


 


 12/25/19 08:20  12/25/19 08:20  12/25/19 08:20  12/25/19 08:20  12/25/19 08:20








                                 Intake & Output











 12/24/19 12/25/19 12/26/19





 06:59 06:59 06:59


 


Intake Total 168 387 


 


Balance 168 387 


 


Weight 58.8 kg 57.3 kg 











General appearance: PRESENT: no acute distress


Eye exam: PRESENT: PERRLA


Respiratory exam: PRESENT: clear to auscultation rivera


Cardiovascular exam: PRESENT: +S1, +S2


GI/Abdominal exam: PRESENT: soft


Neurological exam: PRESENT: alert





Results


Laboratory Results: 


                                        





                                 12/23/19 04:19 





                                 12/23/19 04:19 





                                        











  12/17/19 12/17/19 12/17/19





  15:26 21:52 21:52


 


Creatine Kinase    66


 


CK-MB (CK-2)   


 


Troponin I  < 0.012  


 


NT-Pro-B Natriuret Pep   382 














  12/17/19 12/18/19 12/18/19





  21:52 03:58 03:58


 


Creatine Kinase   77 


 


CK-MB (CK-2)  0.83   0.91


 


Troponin I  0.032   0.021


 


NT-Pro-B Natriuret Pep   














  12/18/19 12/18/19





  11:24 11:24


 


Creatine Kinase  68 


 


CK-MB (CK-2)   0.59


 


Troponin I   0.016


 


NT-Pro-B Natriuret Pep  











Impressions: 


                                        





Chest X-Ray  12/17/19 15:17


IMPRESSION:  COPD.  No acute findings.


 














Assessment & Plan





- Diagnosis


(1) Sepsis


Qualifiers: 


   Sepsis type: Escherichia coli   Sepsis acute organ dysfunction status: with 

acute organ dysfunction   Severe sepsis acute organ dysfunction type: 

encephalopathy   Severe sepsis shock status: with septic shock   Qualified 

Code(s): A41.51 - Sepsis due to Escherichia coli [E. coli]; R65.21 - Severe 

sepsis with septic shock; G93.40 - Encephalopathy, unspecified   


Is this a current diagnosis for this admission?: Yes   





(2) Acute respiratory failure with hypercapnia


Is this a current diagnosis for this admission?: Yes   





(3) Chronic obstructive pulmonary disease


Is this a current diagnosis for this admission?: Yes   





(4) Undernutrition syndrome


Is this a current diagnosis for this admission?: Yes   





(5) Urinary tract infection


Qualifiers: 


   Urinary tract infection type: site unspecified   Hematuria presence: without 

hematuria   Qualified Code(s): N39.0 - Urinary tract infection, site not 

specified   


Is this a current diagnosis for this admission?: Yes   





(6) Atrial fibrillation with RVR


Is this a current diagnosis for this admission?: Yes   





(7) Anxiety


Is this a current diagnosis for this admission?: Yes   





(8) Anemia


Qualifiers: 


   Anemia type: unspecified type   Qualified Code(s): D64.9 - Anemia, 

unspecified   


Is this a current diagnosis for this admission?: Yes   





- Time


Time Spent with patient: 15-24 minutes

## 2019-12-26 NOTE — PDOC PROGRESS REPORT
Subjective


Progress Note for:: 12/26/19


Subjective:: 





The nurses said, it seems that the patient was aspirating today she kept him 

n.p.o. pending speech evaluation


Reason For Visit: 


SEPSIS, ACUTE RESPIRATORY FAILURE, UTI








Physical Exam


Vital Signs: 


                                        











Temp Pulse Resp BP Pulse Ox


 


 97.5 F   102 H  18   143/68 H  100 


 


 12/26/19 08:21  12/26/19 08:21  12/26/19 08:21  12/26/19 08:21  12/26/19 08:21








                                 Intake & Output











 12/25/19 12/26/19 12/27/19





 06:59 06:59 06:59


 


Intake Total 387 50 50


 


Balance 387 50 50


 


Weight 57.3 kg 56.1 kg 











General appearance: PRESENT: no acute distress


Eye exam: PRESENT: PERRLA


Respiratory exam: PRESENT: clear to auscultation rivera


Cardiovascular exam: PRESENT: +S1, +S2


GI/Abdominal exam: PRESENT: soft


Neurological exam: PRESENT: alert





Results


Laboratory Results: 


                                        





                                 12/23/19 04:19 





                                 12/23/19 04:19 





                                        





12/22/19 00:20   Toe - Superficial   Gram Stain - Final


12/22/19 00:20   Toe - Superficial   Wound Culture - Final


                            Skin Mimi





                                        











  12/17/19 12/17/19 12/17/19





  15:26 21:52 21:52


 


Creatine Kinase    66


 


CK-MB (CK-2)   


 


Troponin I  < 0.012  


 


NT-Pro-B Natriuret Pep   382 














  12/17/19 12/18/19 12/18/19





  21:52 03:58 03:58


 


Creatine Kinase   77 


 


CK-MB (CK-2)  0.83   0.91


 


Troponin I  0.032   0.021


 


NT-Pro-B Natriuret Pep   














  12/18/19 12/18/19





  11:24 11:24


 


Creatine Kinase  68 


 


CK-MB (CK-2)   0.59


 


Troponin I   0.016


 


NT-Pro-B Natriuret Pep  











Impressions: 


                                        





Chest X-Ray  12/17/19 15:17


IMPRESSION:  COPD.  No acute findings.


 














Assessment & Plan





- Diagnosis


(1) Sepsis


Qualifiers: 


   Sepsis type: Escherichia coli   Sepsis acute organ dysfunction status: with 

acute organ dysfunction   Severe sepsis acute organ dysfunction type: 

encephalopathy   Severe sepsis shock status: with septic shock   Qualified 

Code(s): A41.51 - Sepsis due to Escherichia coli [E. coli]; R65.21 - Severe 

sepsis with septic shock; G93.40 - Encephalopathy, unspecified   


Is this a current diagnosis for this admission?: Yes   





(2) Acute respiratory failure with hypercapnia


Is this a current diagnosis for this admission?: Yes   





(3) Chronic obstructive pulmonary disease


Is this a current diagnosis for this admission?: Yes   





(4) Undernutrition syndrome


Is this a current diagnosis for this admission?: Yes   





(5) Urinary tract infection


Qualifiers: 


   Urinary tract infection type: site unspecified   Hematuria presence: without 

hematuria   Qualified Code(s): N39.0 - Urinary tract infection, site not 

specified   


Is this a current diagnosis for this admission?: Yes   





(6) Atrial fibrillation with RVR


Is this a current diagnosis for this admission?: Yes   





(7) Anxiety


Is this a current diagnosis for this admission?: Yes   





(8) Anemia


Qualifiers: 


   Anemia type: unspecified type   Qualified Code(s): D64.9 - Anemia, 

unspecified   


Is this a current diagnosis for this admission?: Yes   





- Time


Time Spent with patient: 35 or more minutes





- Plan Summary


Plan Summary: 





Continue treatment overall prognosis is poor

## 2019-12-27 NOTE — PDOC PROGRESS REPORT
Subjective


Progress Note for:: 12/27/19


Subjective:: 





Patient condition extremely poor, he was seen by speech today, n.p.o. 

recommended for patient, he is not a candidate for PEG tube placement, the 

condition continues to tolerate.  I spoke to patient's family and the POA, 

everyone is in agreement with changing status to comfort , patient is not 

expected to do well.  Patient is moribund.  Patient's condition continues to 

deteriorate despite appropriate treatment, he has ESBL E. coli septicemia on 

appropriate antibiotic despite all this patient is not responding, he has severe

underlining chronic comorbid conditions including very severe COPD, severe 

malnutrition, poor function sedentary all these factors culminated in the poor 

outcome despite appropriate treatment.


Reason For Visit: 


SEPSIS, ACUTE RESPIRATORY FAILURE, UTI








Physical Exam


Vital Signs: 


                                        











Temp Pulse Resp BP Pulse Ox


 


 97.4 F   103 H  24 H  147/85 H  100 


 


 12/27/19 12:16  12/27/19 14:00  12/27/19 12:16  12/27/19 12:16  12/27/19 12:16








                                 Intake & Output











 12/26/19 12/27/19 12/28/19





 06:59 06:59 06:59


 


Intake Total 50 100 0


 


Balance 50 100 0


 


Weight 56.1 kg 54.9 kg 54.9 kg











Neurological exam: PRESENT: altered, other - He only grimaces to verbal command,

no eye contact





Results


Laboratory Results: 


                                        





                                 12/23/19 04:19 





                                 12/23/19 04:19 





                                        





12/22/19 00:20   Toe - Superficial   Gram Stain - Final


12/22/19 00:20   Toe - Superficial   Wound Culture - Final


                            Skin Mimi





                                        











  12/17/19 12/17/19 12/17/19





  15:26 21:52 21:52


 


Creatine Kinase    66


 


CK-MB (CK-2)   


 


Troponin I  < 0.012  


 


NT-Pro-B Natriuret Pep   382 














  12/17/19 12/18/19 12/18/19





  21:52 03:58 03:58


 


Creatine Kinase   77 


 


CK-MB (CK-2)  0.83   0.91


 


Troponin I  0.032   0.021


 


NT-Pro-B Natriuret Pep   














  12/18/19 12/18/19





  11:24 11:24


 


Creatine Kinase  68 


 


CK-MB (CK-2)   0.59


 


Troponin I   0.016


 


NT-Pro-B Natriuret Pep  











Impressions: 


                                        





Chest X-Ray  12/17/19 15:17


IMPRESSION:  COPD.  No acute findings.


 














Assessment & Plan





- Diagnosis


(1) Sepsis


Qualifiers: 


   Sepsis type: Escherichia coli   Sepsis acute organ dysfunction status: with 

acute organ dysfunction   Severe sepsis acute organ dysfunction type: 

encephalopathy   Severe sepsis shock status: with septic shock   Qualified 

Code(s): A41.51 - Sepsis due to Escherichia coli [E. coli]; R65.21 - Severe 

sepsis with septic shock; G93.40 - Encephalopathy, unspecified   


Is this a current diagnosis for this admission?: Yes   





(2) Acute respiratory failure with hypercapnia


Is this a current diagnosis for this admission?: Yes   





(3) Chronic obstructive pulmonary disease


Is this a current diagnosis for this admission?: Yes   





(4) Undernutrition syndrome


Is this a current diagnosis for this admission?: Yes   





(5) Urinary tract infection


Qualifiers: 


   Urinary tract infection type: site unspecified   Hematuria presence: without 

hematuria   Qualified Code(s): N39.0 - Urinary tract infection, site not 

specified   


Is this a current diagnosis for this admission?: Yes   





(6) Atrial fibrillation with RVR


Is this a current diagnosis for this admission?: Yes   





(7) Anxiety


Is this a current diagnosis for this admission?: Yes   





(8) Anemia


Qualifiers: 


   Anemia type: unspecified type   Qualified Code(s): D64.9 - Anemia, 

unspecified   


Is this a current diagnosis for this admission?: Yes   





- Time


Time Spent with patient: 35 or more minutes

## 2019-12-28 NOTE — PDOC PROGRESS REPORT
Subjective


Progress Note for:: 12/28/19


Subjective:: 





Patient is moribund. Remain on supplemental oxygen support. No reported fever or

difficulty with breathing.


Reason For Visit: 


SEPSIS, ACUTE RESPIRATORY FAILURE, UTI








Physical Exam


Vital Signs: 


                                        











Temp Pulse Resp BP Pulse Ox


 


 97.4 F   112 H  13   103/51 L  100 


 


 12/28/19 08:03  12/28/19 08:03  12/28/19 08:03  12/28/19 08:03  12/28/19 08:03








                                 Intake & Output











 12/27/19 12/28/19 12/29/19





 06:59 06:59 06:59


 


Intake Total 100 0 


 


Balance 100 0 


 


Weight 54.9 kg 54 kg 











Head exam: PRESENT: atraumatic, normocephalic


Eye exam: PRESENT: conjunctiva pink, PERRLA.  ABSENT: scleral icterus


Ear exam: PRESENT: normal external ear exam


Mouth exam: PRESENT: dry mucosa


Respiratory exam: PRESENT: decreased breath sounds - at lung bases


Cardiovascular exam: PRESENT: +S1, +S2, tachycardia.  ABSENT: diastolic murmur, 

gallop, rubs, systolic murmur


Vascular exam: ABSENT: pallor


GI/Abdominal exam: PRESENT: normal bowel sounds, soft


Extremities exam: ABSENT: pedal edema


Neurological exam: PRESENT: altered - minimal response to painful stimuli na d 

mainly minimal withdrawal movement.


Skin exam: PRESENT: dry, warm





Results


Laboratory Results: 


                                        





                                 12/23/19 04:19 





                                 12/23/19 04:19 





                                        











  12/17/19 12/17/19 12/17/19





  15:26 21:52 21:52


 


Creatine Kinase    66


 


CK-MB (CK-2)   


 


Troponin I  < 0.012  


 


NT-Pro-B Natriuret Pep   382 














  12/17/19 12/18/19 12/18/19





  21:52 03:58 03:58


 


Creatine Kinase   77 


 


CK-MB (CK-2)  0.83   0.91


 


Troponin I  0.032   0.021


 


NT-Pro-B Natriuret Pep   














  12/18/19 12/18/19





  11:24 11:24


 


Creatine Kinase  68 


 


CK-MB (CK-2)   0.59


 


Troponin I   0.016


 


NT-Pro-B Natriuret Pep  











Impressions: 


                                        





Chest X-Ray  12/17/19 15:17


IMPRESSION:  COPD.  No acute findings.


 














Assessment & Plan





- Diagnosis


(1) Protein-calorie undernutrition


Qualifiers: 


   Protein-calorie malnutrition severity: severe   Qualified Code(s): E43 - 

Unspecified severe protein-calorie malnutrition   


Is this a current diagnosis for this admission?: Yes   


Plan: 


Continue supportive care. Overall prognosis remain very poor.








(2) Chronic obstructive pulmonary disease


Qualifiers: 


   COPD type: unspecified COPD   Qualified Code(s): J44.9 - Chronic obstructive 

pulmonary disease, unspecified   


Is this a current diagnosis for this admission?: Yes   


Plan: 


Continue supportive care. Overall prognosis remain very poor.








- Time


Time Spent with patient: 25-34 minutes


Level of Care: IMCU


Medications reviewed and adjusted accordingly: Yes


Anticipated discharge: SNF, Hospice


Within: Other





- Inpatient Certification


Based on my medical assessment, after consideration of the patient's comorb

idities, presenting symptoms, or acuity I expect that the services needed 

warrant INPATIENT care.: Yes


I certify that my determination is in accordance with my understanding of 

Medicare's requirements for reasonable and necessary INPATIENT services [42 CFR 

412.3e].: Yes


Medical Necessity: Significant Comorbidiites Make Outpatient Treatment Too 

Risky, Need Close Monitoring Due to Risk of Patient Decompensation, Need For 

Continuous Telemetry Monitoring, Need for Nebulizer Therapy and Monitoring of 

Response, Risk of Complication if Not Cared For in Hospital, Risk of Diagnosis 

Which Will Require Inpatient Eval/Care/Monitoring


Post Hospital Care: D/C Planner Documentation





- Plan Summary


Plan Summary: 





Continue supportive care. Overall prognosis remain very poor.

## 2023-05-01 NOTE — RADIOLOGY REPORT (SQ)
EXAM DESCRIPTION:  CHEST SINGLE VIEW



COMPLETED DATE/TIME:  12/17/2019 4:21 pm



REASON FOR STUDY:  sepsis



COMPARISON:  7/14/2017



EXAM PARAMETERS:  NUMBER OF VIEWS: One view.

TECHNIQUE: Single frontal radiographic view of the chest acquired.

RADIATION DOSE: NA

LIMITATIONS: None.



FINDINGS:  LUNGS AND PLEURA: The lung fields are hyperexpanded.  Skin folds on the right simulates pn
eumothorax.  No consolidation or effusions.  There is flattening of the hemidiaphragms.

MEDIASTINUM AND HILAR STRUCTURES: No masses.  Contour normal.

HEART AND VASCULAR STRUCTURES: Heart normal in size.  Normal vasculature.

BONES: No acute findings.

HARDWARE: None in the chest.

OTHER: No other significant finding.



IMPRESSION:  COPD.  No acute findings.



TECHNICAL DOCUMENTATION:  JOB ID:  [id number]

 2011 [employer]- All Rights Reserved



Reading location - IP/workstation name: [email or web address] [id number]